# Patient Record
Sex: FEMALE | Race: WHITE | Employment: OTHER | URBAN - METROPOLITAN AREA
[De-identification: names, ages, dates, MRNs, and addresses within clinical notes are randomized per-mention and may not be internally consistent; named-entity substitution may affect disease eponyms.]

---

## 2017-07-05 ENCOUNTER — RADIANT APPOINTMENT (OUTPATIENT)
Dept: GENERAL RADIOLOGY | Facility: CLINIC | Age: 72
End: 2017-07-05
Attending: FAMILY MEDICINE
Payer: COMMERCIAL

## 2017-07-05 ENCOUNTER — OFFICE VISIT (OUTPATIENT)
Dept: FAMILY MEDICINE | Facility: CLINIC | Age: 72
End: 2017-07-05
Payer: COMMERCIAL

## 2017-07-05 VITALS
HEIGHT: 65 IN | SYSTOLIC BLOOD PRESSURE: 138 MMHG | DIASTOLIC BLOOD PRESSURE: 64 MMHG | BODY MASS INDEX: 25.16 KG/M2 | WEIGHT: 151 LBS | HEART RATE: 81 BPM | TEMPERATURE: 98.7 F

## 2017-07-05 DIAGNOSIS — Z13.1 SCREENING FOR DIABETES MELLITUS: ICD-10-CM

## 2017-07-05 DIAGNOSIS — R23.8 VESICULAR RASH: ICD-10-CM

## 2017-07-05 DIAGNOSIS — Z23 NEED FOR DIPHTHERIA-TETANUS-PERTUSSIS (TDAP) VACCINE, ADULT/ADOLESCENT: ICD-10-CM

## 2017-07-05 DIAGNOSIS — M77.41 METATARSALGIA OF RIGHT FOOT: ICD-10-CM

## 2017-07-05 DIAGNOSIS — Z86.0100 HISTORY OF COLONIC POLYPS: ICD-10-CM

## 2017-07-05 DIAGNOSIS — Z13.220 SCREENING CHOLESTEROL LEVEL: ICD-10-CM

## 2017-07-05 DIAGNOSIS — M77.41 METATARSALGIA OF RIGHT FOOT: Primary | ICD-10-CM

## 2017-07-05 PROCEDURE — 73630 X-RAY EXAM OF FOOT: CPT | Mod: RT

## 2017-07-05 PROCEDURE — 90471 IMMUNIZATION ADMIN: CPT | Performed by: FAMILY MEDICINE

## 2017-07-05 PROCEDURE — 99214 OFFICE O/P EST MOD 30 MIN: CPT | Mod: 25 | Performed by: FAMILY MEDICINE

## 2017-07-05 PROCEDURE — 90715 TDAP VACCINE 7 YRS/> IM: CPT | Performed by: FAMILY MEDICINE

## 2017-07-05 RX ORDER — TRIAMCINOLONE ACETONIDE 5 MG/G
CREAM TOPICAL
Qty: 30 G | Refills: 1 | Status: SHIPPED | OUTPATIENT
Start: 2017-07-05 | End: 2022-01-01

## 2017-07-05 NOTE — NURSING NOTE
"Chief Complaint   Patient presents with     Musculoskeletal Problem       Initial /77  Pulse 81  Temp 98.7  F (37.1  C) (Oral)  Ht 5' 4.5\" (1.638 m)  Wt 151 lb (68.5 kg)  BMI 25.52 kg/m2 Estimated body mass index is 25.52 kg/(m^2) as calculated from the following:    Height as of this encounter: 5' 4.5\" (1.638 m).    Weight as of this encounter: 151 lb (68.5 kg).  Medication Reconciliation: complete     Gabriela Del Valle MA      "

## 2017-07-05 NOTE — NURSING NOTE
Screening Questionnaire for Adult Immunization    Are you sick today?   No   Do you have allergies to medications, food, a vaccine component or latex?   No   Have you ever had a serious reaction after receiving a vaccination?   No   Do you have a long-term health problem with heart disease, lung disease, asthma, kidney disease, metabolic disease (e.g. diabetes), anemia, or other blood disorder?   No   Do you have cancer, leukemia, HIV/AIDS, or any other immune system problem?   No   In the past 3 months, have you taken medications that affect  your immune system, such as prednisone, other steroids, or anticancer drugs; drugs for the treatment of rheumatoid arthritis, Crohn s disease, or psoriasis; or have you had radiation treatments?   No   Have you had a seizure, or a brain or other nervous system problem?   No   During the past year, have you received a transfusion of blood or blood     products, or been given immune (gamma) globulin or antiviral drug?   No   For women: Are you pregnant or is there a chance you could become        pregnant during the next month?   No   Have you received any vaccinations in the past 4 weeks?   No     Immunization questionnaire answers were all negative.      MNVFC doesn't apply on this patient    Per orders of Dr. Castillo, injection of TDAP given by Gabriela Del Valle. Patient instructed to remain in clinic for 15 minutes afterwards, and to report any adverse reaction to me immediately.       Screening performed by Gabriela Del Valle on 7/5/2017 at 12:36 PM.

## 2017-07-05 NOTE — MR AVS SNAPSHOT
After Visit Summary   7/5/2017    Marga Clark    MRN: [id number]           Patient Information     Date Of Birth          1945        Visit Information        Provider Department      7/5/2017 11:40 AM Tiarra Mak MD [facility]        Today's Diagnoses     Metatarsalgia of right foot    -  1    Vesicular rash        History of colonic polyps        Need for diphtheria-tetanus-pertussis (Tdap) vaccine, adult/adolescent        Screening for diabetes mellitus        Screening cholesterol level           Follow-ups after your visit        Additional Services     GASTROENTEROLOGY ADULT REF PROCEDURE ONLY       Last Lab Result: Creatinine (mg/dL)       Date                     Value                 10/30/2013               0.62             ----------  There is no height or weight on file to calculate BMI.     Needed:  No  Language:  English    Patient will be contacted to schedule procedure.     Please be aware that coverage of these services is subject to the terms and limitations of your health insurance plan.  Call member services at your health plan with any benefit or coverage questions.  Any procedures must be performed at a [facility] facility OR coordinated by your clinic's referral office.    Please bring the following with you to your appointment:    (1) Any X-Rays, CTs or MRIs which have been performed.  Contact the facility where they were done to arrange for  prior to your scheduled appointment.    (2) List of current medications   (3) This referral request   (4) Any documents/labs given to you for this referral                  Your next 10 appointments already scheduled     Jul 14, 2017  8:15 AM CDT   LAB with AN LAB   [facility]    [address] [address] [phone]           Patient must bring picture ID.  Patient should be prepared to give a urine specimen  Please do not eat 10-12  "hours before your appointment if you are coming in fasting for labs on lipids, cholesterol, or glucose (sugar).  Pregnant women should follow their Care Team instructions. Water with medications is okay. Do not drink coffee or other fluids.   If you have concerns about taking  your medications, please ask at office or if scheduling via [facility], send a message by clicking on Secure Messaging, Message Your Care Team.              Future tests that were ordered for you today     Open Future Orders        Priority Expected Expires Ordered    **Lipid panel reflex to direct LDL FUTURE anytime Routine 7/5/2017 7/5/2018 7/5/2017    **Glucose FUTURE anytime Routine 7/5/2017 7/5/2018 7/5/2017            Who to contact     If you have questions or need follow up information about today's clinic visit or your schedule please contact [facility] directly at [phone].  Normal or non-critical lab and imaging results will be communicated to you by MyChart, letter or phone within 4 business days after the clinic has received the results. If you do not hear from us within 7 days, please contact the clinic through [facility] or phone. If you have a critical or abnormal lab result, we will notify you by phone as soon as possible.  Submit refill requests through [employer] or call your pharmacy and they will forward the refill request to us. Please allow 3 business days for your refill to be completed.          Additional Information About Your Visit        [facility] Information     [facility] lets you send messages to your doctor, view your test results, renew your prescriptions, schedule appointments and more. To sign up, go to [email or web address] . Click on \"Log in\" on the left side of the screen, which will take you to the Welcome page. Then click on \"Sign up Now\" on the right side of the page.     You will be asked to enter the access code listed below, as well as some personal information. Please follow the directions " "to create your username and password.     Your access code is: [email or web address]  Expires: 10/4/2017 10:32 AM     Your access code will  in 90 days. If you need help or a new code, please call your [facility] or [phone].        Care EveryWhere ID     This is your Care EveryWhere ID. This could be used by other organizations to access your [facility] [place] medical records  [id number]        Your Vitals Were     Pulse Temperature Height BMI (Body Mass Index)          81 98.7  F (37.1  C) (Oral) 5' 4.5\" (1.638 m) 25.52 kg/m2         Blood Pressure from Last 3 Encounters:   17 138/64   10/30/13 139/73   13 141/83    Weight from Last 3 Encounters:   17 151 lb (68.5 kg)   10/30/13 154 lb (69.9 kg)   13 153 lb (69.4 kg)              We Performed the Following     GASTROENTEROLOGY ADULT REF PROCEDURE ONLY     TDAP VACCINE (ADACEL)          Today's Medication Changes          These changes are accurate as of: 17 11:59 PM.  If you have any questions, ask your nurse or doctor.               Start taking these medicines.        Dose/Directions    triamcinolone 0.5 % cream   Commonly known as:  KENALOG   Used for:  Vesicular rash   Started by:  Tiarra Mak MD        Apply to rash on feet bid til clear   Quantity:  30 g   Refills:  1         Stop taking these medicines if you haven't already. Please contact your care team if you have questions.     sulfamethoxazole-trimethoprim 800-160 MG per tablet   Commonly known as:  BACTRIM DS/SEPTRA DS   Stopped by:  Tiarra Mak MD                Where to get your medicines      These medications were sent to [facility]/pharmacy #[id number] - [place] - [address],   [address] [address] [address] [facility] [address] [place] [address]     Phone:  [phone]     triamcinolone 0.5 % cream                Primary Care Provider Office Phone # Fax #    Tiarra Mak [phone]       [facility] [id number] [place] [id number]      "   Equal Access to Services     [facility] Center[facility] : Hadii aad ku hadkayliefreeman Elsiejennifer, wamarkellda luqadaha, qamarista harrypeteedi berger. So [facility] [phone].    ATENCIÓN: Si habla español, tiene a saldivar disposición servicios gratuitos de asistencia lingüística. Llame al [phone].    We comply with applicable federal civil rights laws and [place] laws. We do not discriminate on the basis of race, color, national origin, age, disability sex, sexual orientation or gender identity.            Thank you!     Thank you for choosing [facility]  [facility]. Our goal is always to provide you with excellent care. Hearing back from our patients is one way we can continue to improve our services. Please take a few minutes to complete the written survey that you may receive in the mail after your visit with us. Thank you!             Your Updated Medication List - Protect others around you: Learn how to safely use, store and throw away your medicines at www.disposemymeds.org.          This list is accurate as of: 7/5/17 11:59 PM.  Always use your most recent med list.                   Brand Name Dispense Instructions for use Diagnosis    triamcinolone 0.5 % cream    KENALOG    30 g    Apply to rash on feet bid til clear    Vesicular rash

## 2017-07-05 NOTE — PROGRESS NOTES
"  SUBJECTIVE:                                                    Marga Clark is a 71 year old female who presents to clinic today for the following health issues:        Sore  right foot, hurts to walk on it x 2 months changes day to day     Pt with pain and callous build up on right foot just below MTP joint. Is painful to step on it.   Has been shaving down the callous but still has pain  No trauma to her foot.     Pt with vesicular intermittent rash to both feet. In past used steroid on it and improved.   Suspect dishydrotic eczema. Will refill    Pt due for colonoscopy and tdap and is agreeable        Problem list and histories reviewed & adjusted, as indicated.  Additional history: as documented    Labs reviewed in EPIC    Reviewed and updated as needed this visit by clinical staff  Tobacco  Allergies  Meds  Med Hx  Surg Hx  Fam Hx  Soc Hx      Reviewed and updated as needed this visit by Provider         ROS:  Constitutional, HEENT, cardiovascular, pulmonary, gi and gu systems are negative, except as otherwise noted.    OBJECTIVE:     /77  Pulse 81  Temp 98.7  F (37.1  C) (Oral)  Ht 5' 4.5\" (1.638 m)  Wt 151 lb (68.5 kg)  BMI 25.52 kg/m2  Body mass index is 25.52 kg/(m^2).  GENERAL: healthy, alert and no distress  MS: no gross musculoskeletal defects noted, no edema, callous and pain to palpation over distal 5th metatarsal. Did pare down the callous somewhat and pain was slightly improved.   SKIN: dry vesicular lesions noted over posterior heels and extending onto plantar surface of both feet.     Diagnostic Test Results:  Xray - right foot. : normal    ASSESSMENT/PLAN:       1. Metatarsalgia of right foot  Metatarsal pad, rest, good shoes, keep wearing down callous as it builds up. If not improving, then fu with podiatry  - XR Foot Right G/E 3 Views; Future    2. Vesicular rash  Use sparingly as needed  - triamcinolone (KENALOG) 0.5 % cream; Apply to rash on feet bid til clear  Dispense: " 30 g; Refill: 1    3. History of colonic polyps    - GASTROENTEROLOGY ADULT REF PROCEDURE ONLY    4. Need for diphtheria-tetanus-pertussis (Tdap) vaccine, adult/adolescent  given  - TDAP VACCINE (ADACEL)    5. Screening for diabetes mellitus    - **Lipid panel reflex to direct LDL FUTURE anytime; Future  - **Glucose FUTURE anytime; Future    6. Screening cholesterol level    - **Lipid panel reflex to direct LDL FUTURE anytime; Future        Tiarra Castillo MD  [facility]

## 2017-07-14 DIAGNOSIS — Z13.220 SCREENING CHOLESTEROL LEVEL: ICD-10-CM

## 2017-07-14 DIAGNOSIS — Z13.1 SCREENING FOR DIABETES MELLITUS: ICD-10-CM

## 2017-07-14 LAB
CHOLEST SERPL-MCNC: 230 MG/DL
GLUCOSE SERPL-MCNC: 80 MG/DL (ref 70–99)
HDLC SERPL-MCNC: 73 MG/DL
LDLC SERPL CALC-MCNC: 143 MG/DL
NONHDLC SERPL-MCNC: 157 MG/DL
TRIGL SERPL-MCNC: 72 MG/DL

## 2017-07-14 PROCEDURE — 36415 COLL VENOUS BLD VENIPUNCTURE: CPT | Performed by: FAMILY MEDICINE

## 2017-07-14 PROCEDURE — 82947 ASSAY GLUCOSE BLOOD QUANT: CPT | Performed by: FAMILY MEDICINE

## 2017-07-14 PROCEDURE — 80061 LIPID PANEL: CPT | Performed by: FAMILY MEDICINE

## 2017-07-14 NOTE — PROGRESS NOTES
I have reviewed the patient's lab(s) or message and I feel they can be held for the provider until they return to clinic.

## 2017-08-02 ENCOUNTER — TELEPHONE (OUTPATIENT)
Dept: FAMILY MEDICINE | Facility: CLINIC | Age: 72
End: 2017-08-02

## 2017-08-02 NOTE — TELEPHONE ENCOUNTER
Notes Recorded by Tiarra Mak MD on 7/16/2017 at 11:23 AM  Your cholesterol has improved.  Great job. Keep up the good work  Your blood sugar is also normal.  Tiarra Mak    Called patient, results went to mychart, patient is no active on mychart. Updated her mychart status to declined. Read patient the result note per Dr. Tiarra Mak. Patient verbalized understanding     , patient would like result note and labs sent via letter to her home.      Emily Zimmer, NEYMARN RN

## 2017-08-02 NOTE — TELEPHONE ENCOUNTER
Patient states she would like the results of her blood tests from 7-14.  Please call.    Thank you.

## 2017-08-02 NOTE — LETTER
[facility]  [address] [facility] [address] [phone]        August 2, 2017    Marga Clark  [address] [address]          Dear Marga,    Your cholesterol has improved.  Great job.  Keep up the good work.  Your blood sugar is also normal.    If you have any questions or concerns, please call myself or my nurse at [phone].    Sincerely,    Tiarra Castillo MD/meghana      Results for orders placed or performed in visit on 07/14/17   **Lipid panel reflex to direct LDL FUTURE anytime   Result Value Ref Range    Cholesterol 230 (H) <200 mg/dL    Triglycerides 72 <150 mg/dL    HDL Cholesterol 73 >49 mg/dL    LDL Cholesterol Calculated 143 (H) <100 mg/dL    Non HDL Cholesterol 157 (H) <130 mg/dL   **Glucose FUTURE anytime   Result Value Ref Range    Glucose 80 70 - 99 mg/dL

## 2017-08-25 ENCOUNTER — OFFICE VISIT (OUTPATIENT)
Dept: FAMILY MEDICINE | Facility: CLINIC | Age: 72
End: 2017-08-25
Payer: COMMERCIAL

## 2017-08-25 VITALS
TEMPERATURE: 97.9 F | DIASTOLIC BLOOD PRESSURE: 62 MMHG | SYSTOLIC BLOOD PRESSURE: 148 MMHG | HEART RATE: 90 BPM | WEIGHT: 152 LBS | BODY MASS INDEX: 25.69 KG/M2 | OXYGEN SATURATION: 96 %

## 2017-08-25 DIAGNOSIS — J01.90 ACUTE SINUSITIS WITH SYMPTOMS > 10 DAYS: Primary | ICD-10-CM

## 2017-08-25 DIAGNOSIS — R05.9 COUGH: ICD-10-CM

## 2017-08-25 PROCEDURE — 99213 OFFICE O/P EST LOW 20 MIN: CPT | Performed by: PHYSICIAN ASSISTANT

## 2017-08-25 RX ORDER — AMOXICILLIN 500 MG/1
1000 CAPSULE ORAL 2 TIMES DAILY
Qty: 40 CAPSULE | Refills: 0 | Status: SHIPPED | OUTPATIENT
Start: 2017-08-25 | End: 2017-09-04

## 2017-08-25 RX ORDER — FLUTICASONE PROPIONATE 50 MCG
2 SPRAY, SUSPENSION (ML) NASAL DAILY
Qty: 16 G | Refills: 0 | Status: SHIPPED | OUTPATIENT
Start: 2017-08-25 | End: 2019-05-15

## 2017-08-25 RX ORDER — BENZONATATE 200 MG/1
200 CAPSULE ORAL 3 TIMES DAILY PRN
Qty: 21 CAPSULE | Refills: 0 | Status: SHIPPED | OUTPATIENT
Start: 2017-08-25 | End: 2019-05-15

## 2017-08-25 RX ORDER — HYDROXYCHLOROQUINE SULFATE 200 MG/1
TABLET, FILM COATED ORAL
COMMUNITY
Start: 2017-05-11 | End: 2017-10-30

## 2017-08-25 NOTE — PATIENT INSTRUCTIONS
Rest and increase fluids.    Sleep with head of bed elevated at night. Have a humidifier running at night    Alternate motrin and tylenol as needed for discomfort.    Perform nasal saline rinses to help decrease nasal congestion or breath in steam multiple times daily.     Use the flonase as prescribed for the next 1-2 weeks.    Take the tessalon perles as prescribed for your cough.    Follow up with primary care provider in 1-2 weeks if no improvement of symptoms. Sooner if any worsening of symptoms.

## 2017-08-25 NOTE — PROGRESS NOTES
SUBJECTIVE:   Marga Clark is a 71 year old female who presents to clinic today for the following health issues:      SINUS SYMPTOMS      Duration: 1 month    Description  Cough - was intermittent initially, but has become persist - productive with clear phlegm; shortness of breath with coughing fits, chest pain only with cough fits. denies wheezing.  Nasal congestion and maxillary sinus pressure - she is having headaches from the pressure. Intermittent maxillary teeth pain from sinus pressure.  Subjective fevers on Wednesday.   Denies significant sore throat, ear pain, and rashes.    Severity: moderate    Accompanying signs and symptoms: see above    History (predisposing factors):  Started with cold sx's but has gotten worse per pt    Precipitating or alleviating factors: None    Therapies tried and outcome: maria luz-nikkieer - little relief       Patient is on Plaquenil for treatment of inflammatory arthritis. She follows with rheumatology through [facility]. She may be returning to [facility] in the future. Denies any problems or complications with her inflammatory arthritis.        Problem list and histories reviewed & adjusted, as indicated.  Additional history: as documented    Patient Active Problem List   Diagnosis     CARDIOVASCULAR SCREENING; LDL GOAL LESS THAN 130     Advanced directives, counseling/discussion     High risk medications (not anticoagulants) long-term use     Inflammatory arthritis     Steroid long-term use     History reviewed. No pertinent surgical history.    Social History   Substance Use Topics     Smoking status: Current Every Day Smoker     Packs/day: 0.50     Years: 40.00     Types: Cigarettes     Smokeless tobacco: Never Used     Alcohol use Yes      Comment: occas     Family History   Problem Relation Age of Onset     CEREBROVASCULAR DISEASE Mother      HEART DISEASE Father      DIABETES Maternal Grandfather      DIABETES Paternal Grandfather          Current Outpatient  Prescriptions   Medication Sig Dispense Refill     hydroxychloroquine (PLAQUENIL) 200 MG tablet Take one pill by mouth twice daily       amoxicillin (AMOXIL) 500 MG capsule Take 2 capsules (1,000 mg) by mouth 2 times daily for 10 days 40 capsule 0     fluticasone (FLONASE) 50 MCG/ACT spray Spray 2 sprays into both nostrils daily 16 g 0     benzonatate (TESSALON) 200 MG capsule Take 1 capsule (200 mg) by mouth 3 times daily as needed for cough 21 capsule 0     triamcinolone (KENALOG) 0.5 % cream Apply to rash on feet bid til clear 30 g 1     No Known Allergies  BP Readings from Last 3 Encounters:   08/25/17 157/75   07/05/17 138/64   10/30/13 139/73    Wt Readings from Last 3 Encounters:   08/25/17 152 lb (68.9 kg)   07/05/17 151 lb (68.5 kg)   10/30/13 154 lb (69.9 kg)                        Reviewed and updated as needed this visit by clinical staffTobacco  Allergies  Meds  Med Hx  Surg Hx  Fam Hx  Soc Hx      Reviewed and updated as needed this visit by Provider         ROS:  Constitutional, HEENT, cardiovascular, pulmonary, and integumentary systems are negative, except as otherwise noted.      OBJECTIVE:   /62  Pulse 90  Temp 97.9  F (36.6  C) (Oral)  Wt 152 lb (68.9 kg)  SpO2 96%  BMI 25.69 kg/m2  Body mass index is 25.69 kg/(m^2).  GENERAL: healthy, alert and no distress  EYES: Eyes grossly normal to inspection  HENT: normal cephalic/atraumatic, ear canals and TM's normal, nose and mouth without ulcers or lesions, rhinorrhea clear and yellow, oropharynx clear, oral mucous membranes moist and sinuses: maxillary, frontal tenderness on both sides  NECK: no adenopathy, no asymmetry, masses, or scars and thyroid normal to palpation  RESP: lungs clear to auscultation - no rales, rhonchi or wheezes  CV: regular rate and rhythm, normal S1 S2, no S3 or S4, no murmur, click or rub, no peripheral edema and peripheral pulses strong  MS: no gross musculoskeletal defects noted, no edema  SKIN: no suspicious  lesions or rashes        ASSESSMENT/PLAN:       ICD-10-CM    1. Acute sinusitis with symptoms > 10 days J01.90 amoxicillin (AMOXIL) 500 MG capsule     fluticasone (FLONASE) 50 MCG/ACT spray   2. Cough R05 benzonatate (TESSALON) 200 MG capsule       Patient Instructions   Rest and increase fluids.    Sleep with head of bed elevated at night. Have a humidifier running at night    Alternate motrin and tylenol as needed for discomfort.    Perform nasal saline rinses to help decrease nasal congestion or breath in steam multiple times daily.     Use the flonase as prescribed for the next 1-2 weeks.    Take the tessalon perles as prescribed for your cough.    Follow up with primary care provider in 1-2 weeks if no improvement of symptoms. Sooner if any worsening of symptoms.           Mana Ramos PA-C  [facility]

## 2017-08-25 NOTE — NURSING NOTE
"Chief Complaint   Patient presents with     Sinus Problem       Initial /75  Pulse 90  Temp 97.9  F (36.6  C) (Oral)  Wt 152 lb (68.9 kg)  SpO2 96%  BMI 25.69 kg/m2 Estimated body mass index is 25.69 kg/(m^2) as calculated from the following:    Height as of 7/5/17: 5' 4.5\" (1.638 m).    Weight as of this encounter: 152 lb (68.9 kg).  Medication Reconciliation: complete  Emily Batres CMA    "

## 2017-08-25 NOTE — MR AVS SNAPSHOT
After Visit Summary   8/25/2017    Marga Clark    MRN: [id number]           Patient Information     Date Of Birth          1945        Visit Information        Provider Department      8/25/2017 10:40 AM Mana Ramos PA-C [facility]        Today's Diagnoses     Acute sinusitis with symptoms > 10 days    -  1    Cough          Care Instructions    Rest and increase fluids.    Sleep with head of bed elevated at night. Have a humidifier running at night    Alternate motrin and tylenol as needed for discomfort.    Perform nasal saline rinses to help decrease nasal congestion or breath in steam multiple times daily.     Use the flonase as prescribed for the next 1-2 weeks.    Take the tessalon perles as prescribed for your cough.    Follow up with primary care provider in 1-2 weeks if no improvement of symptoms. Sooner if any worsening of symptoms.               Follow-ups after your visit        Who to contact     If you have questions or need follow up information about today's clinic visit or your schedule please contact [facility] directly at [phone].  Normal or non-critical lab and imaging results will be communicated to you by MyChart, letter or phone within 4 business days after the clinic has received the results. If you do not hear from us within 7 days, please contact the clinic through MyChart or phone. If you have a critical or abnormal lab result, we will notify you by phone as soon as possible.  Submit refill requests through [employer] or call your pharmacy and they will forward the refill request to us. Please allow 3 business days for your refill to be completed.          Additional Information About Your Visit        Care EveryWhere ID     This is your Care EveryWhere ID. This could be used by other organizations to access your [facility] medical records  [id number]        Your Vitals Were     Pulse Temperature Pulse Oximetry BMI (Body  Mass Index)          90 97.9  F (36.6  C) (Oral) 96% 25.69 kg/m2         Blood Pressure from Last 3 Encounters:   08/25/17 157/75   07/05/17 138/64   10/30/13 139/73    Weight from Last 3 Encounters:   08/25/17 152 lb (68.9 kg)   07/05/17 151 lb (68.5 kg)   10/30/13 154 lb (69.9 kg)              Today, you had the following     No orders found for display         Today's Medication Changes          These changes are accurate as of: 8/25/17 11:03 AM.  If you have any questions, ask your nurse or doctor.               Start taking these medicines.        Dose/Directions    amoxicillin 500 MG capsule   Commonly known as:  AMOXIL   Used for:  Acute sinusitis with symptoms > 10 days   Started by:  Mana Ramos PA-C        Dose:  1000 mg   Take 2 capsules (1,000 mg) by mouth 2 times daily for 10 days   Quantity:  40 capsule   Refills:  0       benzonatate 200 MG capsule   Commonly known as:  TESSALON   Used for:  Cough   Started by:  Mana Ramos PA-C        Dose:  200 mg   Take 1 capsule (200 mg) by mouth 3 times daily as needed for cough   Quantity:  21 capsule   Refills:  0       fluticasone 50 MCG/ACT spray   Commonly known as:  FLONASE   Used for:  Acute sinusitis with symptoms > 10 days   Started by:  Mana Ramos PA-C        Dose:  2 spray   Spray 2 sprays into both nostrils daily   Quantity:  16 g   Refills:  0            Where to get your medicines      These medications were sent to [id number] - [place], [place] - [address] [facility]  [address] [facility] [address]    Hours:  24-hours Phone:  [phone]     amoxicillin 500 MG capsule    benzonatate 200 MG capsule    fluticasone 50 MCG/ACT spray                Primary Care Provider Office Phone # Fax #    Tiarra Mak [phone] [address] [facility] [address]        Equal Access to Services     KORI BENITEZ AH: Billie iraheta  josesito Tello, wamarkellda lumontanaadaha, qaybta kaalmada aime, edi alyssain hayaan matthieumilvia tysonkt laalejandraregina ferny. So [facility] [phone].    ATENCIÓN: Si habla louann, tiene a saldivar disposición servicios gratuitos de asistencia lingüística. Re al [phone].    We comply with applicable federal civil rights laws and [place] laws. We do not discriminate on the basis of race, color, national origin, age, disability sex, sexual orientation or gender identity.            Thank you!     Thank you for choosing [facility]  for your care. Our goal is always to provide you with excellent care. Hearing back from our patients is one way we can continue to improve our services. Please take a few minutes to complete the written survey that you may receive in the mail after your visit with us. Thank you!             Your Updated Medication List - Protect others around you: Learn how to safely use, store and throw away your medicines at www.disposemymeds.org.          This list is accurate as of: 8/25/17 11:03 AM.  Always use your most recent med list.                   Brand Name Dispense Instructions for use Diagnosis    amoxicillin 500 MG capsule    AMOXIL    40 capsule    Take 2 capsules (1,000 mg) by mouth 2 times daily for 10 days    Acute sinusitis with symptoms > 10 days       benzonatate 200 MG capsule    TESSALON    21 capsule    Take 1 capsule (200 mg) by mouth 3 times daily as needed for cough    Cough       fluticasone 50 MCG/ACT spray    FLONASE    16 g    Spray 2 sprays into both nostrils daily    Acute sinusitis with symptoms > 10 days       hydroxychloroquine 200 MG tablet    PLAQUENIL     Take one pill by mouth twice daily        triamcinolone 0.5 % cream    KENALOG    30 g    Apply to rash on feet bid til clear    Vesicular rash

## 2017-08-28 ENCOUNTER — TELEPHONE (OUTPATIENT)
Dept: FAMILY MEDICINE | Facility: CLINIC | Age: 72
End: 2017-08-28

## 2017-08-28 NOTE — TELEPHONE ENCOUNTER
Reason for call:  Patient reporting a symptom    Symptom or request: stomach upset    Duration (how long have symptoms been present): Friday    Have you been treated for this before? No    Additional comments: Patient started an antibiotic on Friday, currently having stomach upset, wondering if she could switch to a different medicaiton    Phone Number patient can be reached at:  Home number on file [phone] (home)    Best Time:  any    Can we leave a detailed message on this number:  YES    Call taken on 8/28/2017 at 7:57 AM by Maida Sapp

## 2017-08-28 NOTE — TELEPHONE ENCOUNTER
Per 8/25/17 ov   Patient Instructions   Rest and increase fluids.  Sleep with head of bed elevated at night. Have a humidifier running at night  Alternate motrin and tylenol as needed for discomfort.  Perform nasal saline rinses to help decrease nasal congestion or breath in steam multiple times daily.   Use the flonase as prescribed for the next 1-2 weeks.  Take the tessalon perles as prescribed for your cough.  Follow up with primary care provider in 1-2 weeks if no improvement of symptoms. Sooner if any worsening of symptoms.   Mana Ramos PA-C  [facility]    Patient started the prescribed medications Friday night. Flonase, tessalon, and amoxicillin.  Saturday morning she felt okay. Stomach upset started Saturday afternoon. Nausea and abdominal cramping. Rating her discomfort as moderate now. She continued to take antibiotic through Sunday.    She has never been on amoxicillin.   She didn't take antibiotic today. She is requesting alternative antibiotic.   To provider to advise.   NEYMAR MayfieldN RN

## 2017-08-28 NOTE — TELEPHONE ENCOUNTER
Is the abdominal cramping persistent or only after she takes the medication? Is she taking the medication with food? Are her sinus symptoms improving? This antibiotic should be easy on her stomach. My fear of changing antibiotics at this time would be increase in resistance and worsening of her abdominal symptoms. If symptoms are that intolerable, I would be willing to try Zithromax, which she has had in the past.     Mana Ramos PA-C

## 2017-10-08 ENCOUNTER — HEALTH MAINTENANCE LETTER (OUTPATIENT)
Age: 72
End: 2017-10-08

## 2017-10-30 ENCOUNTER — OFFICE VISIT (OUTPATIENT)
Dept: RHEUMATOLOGY | Facility: CLINIC | Age: 72
End: 2017-10-30
Payer: COMMERCIAL

## 2017-10-30 VITALS
BODY MASS INDEX: 24.56 KG/M2 | DIASTOLIC BLOOD PRESSURE: 82 MMHG | OXYGEN SATURATION: 98 % | HEART RATE: 81 BPM | WEIGHT: 147.4 LBS | HEIGHT: 65 IN | SYSTOLIC BLOOD PRESSURE: 154 MMHG | TEMPERATURE: 97.5 F

## 2017-10-30 DIAGNOSIS — Z23 NEED FOR PROPHYLACTIC VACCINATION AGAINST STREPTOCOCCUS PNEUMONIAE (PNEUMOCOCCUS): ICD-10-CM

## 2017-10-30 DIAGNOSIS — M06.09 RHEUMATOID ARTHRITIS OF MULTIPLE SITES WITH NEGATIVE RHEUMATOID FACTOR (H): Primary | ICD-10-CM

## 2017-10-30 DIAGNOSIS — Z79.899 HIGH RISK MEDICATION USE: ICD-10-CM

## 2017-10-30 PROCEDURE — 99204 OFFICE O/P NEW MOD 45 MIN: CPT | Performed by: INTERNAL MEDICINE

## 2017-10-30 PROCEDURE — G0009 ADMIN PNEUMOCOCCAL VACCINE: HCPCS | Performed by: INTERNAL MEDICINE

## 2017-10-30 PROCEDURE — 90670 PCV13 VACCINE IM: CPT | Performed by: INTERNAL MEDICINE

## 2017-10-30 RX ORDER — HYDROXYCHLOROQUINE SULFATE 200 MG/1
200 TABLET, FILM COATED ORAL DAILY
Qty: 90 TABLET | Refills: 0 | Status: SHIPPED | OUTPATIENT
Start: 2017-10-30 | End: 2018-01-29

## 2017-10-30 NOTE — PROGRESS NOTES
Rheumatology Clinic Visit      Marga Clark MRN# [id number]   YOB: 1945 Age: 72 year old      Date of visit: 10/30/17   PCP: Dr. Tiarra Mak    Chief Complaint   Patient presents with:  Consult: patient states she is doing fine. Was an [facility] patient      Assessment and Plan     1. Rheumatoid arthritis (RF negative [[facility]], CCP low positive): Dx'd 2011.  Has been followed by Prince Ritter ([place]) and Frances ([facility]).  Established with me on 10/30/2017. Previously on MTX (effective), prednisone (effective).  MTX and prednisone were stopped when she was doing well; inflammatory markers elevated so HCQ was started (per record review and patient interview).  Currently on HCQ 200mg BID.  Doing well today.  Reduced HCQ today to 200mg daily and if still doing well at f/u then will discontinue.  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  - Reduce hydroxychloroquine to 200mg daily  - Ophthalmology referral for hydroxychloroquine toxicity monitoring  - Request eye exam records from [facility] in [facility], where Ms. Clark says she had HCQ tox monitoring 6-9 months ago  - Labs 2-3 days prior to the next rheumatology clinic visit: CBC, Creatinine, Hepatic Panel, ESR, CRP, Hepatitis B    # Hydroxychloroquine (Plaquenil) Risks and Benefits:  The risks and benefits of hydroxychloroquine were discussed in detail and the patient verbalized understanding; the patient also verbalized agreement to get the required ophthalmologic toxicity monitoring.  The risks discussed include, but are not limited to, the risk for hypersensitivity, anaphylaxis, anaphylactoid reactions, irreversible retinal damage, rare hematologic reactions, and rare cardiomyopathy.  Patients with G6PD deficiency or hepatic impairment may be at an increased risk for adverse effects.  I encouraged reviewing the package insert and asking any questions about the medication.      2. Left lateral epicondylitis: Has been physical therapy  twice in the past, per patient, with symptoms resolving as long as she does the exercises. Symptoms worsen if she does not do the exercises. She says that she plans to restart the exercises.    3. Hypertersion: Blood pressure checked this clinic visit and is elevated; I advised her to f/u with her PCP for management.     4. Vaccinations: Vaccinations reviewed with Ms. Clark.  Risks and benefits of vaccinations were discussed.  - Influenza: refused by patient  - [id number]: will receive today    Ms. Clark verbalized agreement with and understanding of the rational for the diagnosis and treatment plan.  All questions were answered to best of my ability and the patient's satisfaction. Ms. Clark was advised to contact the clinic with any questions that may arise after the clinic visit.      Thank you for involving me in the care of the patient    Return to clinic: 3 months      HPI   Marga Clark is a 72 year old female with a past medical history significant for inflammatory arthritis who presents for evaluation of inflammatory arthritis.    7/9/2013 rheumatology clinic note by Dr. Gama Ritter document psoriatic arthritis, and low positive CCP antibodies. Assessment and plan documents likely rheumatoid arthritis with positive antibodies. Doing well with methotrexate.    5/11/2017 [facility] rheumatology clinic note by Dr. Daniels documents rheumatoid arthritis treated with Plaquenil 200 mg twice daily.     Today, Ms. Clark reports that she is doing very well with regard to rheumatoid arthritis. When she first presented, she had pain and stiffness in her joints that resolved quickly with prednisone and methotrexate. When she transferred care from [place] to [facility], prednisone and methotrexate were discontinued. Subsequent labs had inflammatory markers elevated, but she says that she felt fine, but to keep the rheumatoid arthritis under control hydroxychloroquine was started. She says that she still feels  well. Some pain with more activity in her hands that improves with rest. No pain when she wakes up in the morning. Morning stiffness for no more than 2 minutes. She does have a history of bilateral trochanteric bursitis that has been treated with steroid injections that were very effective; she does not feel like they are needed again today. She also has a history of left lateral epicondylitis was treated with physical therapy; if she does not do the physical therapy exercises but her symptoms return.    Denies fevers, chills, nausea, vomiting, constipation, diarrhea. No abdominal pain. No chest pain/pressure, palpitations, or shortness of breath. No LE swelling. No neck pain. No oral or nasal sores.  No rash. No sicca symptoms.     Tobacco: 0.5 ppd  EtOH: Occasional  Drugs: None    ROS   GEN: No fevers, chills, or night sweats  SKIN: No itching, rashes, sores  HEENT: No oral or nasal ulcers.  CV: No chest pain, pressure, palpitations, or dyspnea on exertion.  PULM: No SOB, wheeze, cough.  GI: No nausea, vomiting, constipation, diarrhea. No blood in stool. No abdominal pain.  : No blood in urine.  MSK: See HPI.  NEURO: No numbness, tingling, or weakness.  EXT: No LE swelling  PSYCH: Negative    Active Problem List     Patient Active Problem List   Diagnosis     CARDIOVASCULAR SCREENING; LDL GOAL LESS THAN 130     Advanced directives, counseling/discussion     High risk medications (not anticoagulants) long-term use     Inflammatory arthritis     Steroid long-term use     Past Medical History     Past Medical History:   Diagnosis Date     Inflammatory arthritis 10/31/2011     Past Surgical History   History reviewed. No pertinent surgical history.  Allergy   No Known Allergies  Current Medication List     Current Outpatient Prescriptions   Medication Sig     hydroxychloroquine (PLAQUENIL) 200 MG tablet Take 1 tablet (200 mg) by mouth daily     triamcinolone (KENALOG) 0.5 % cream Apply to rash on feet bid til clear  "    fluticasone (FLONASE) 50 MCG/ACT spray Spray 2 sprays into both nostrils daily (Patient not taking: Reported on 10/30/2017)     benzonatate (TESSALON) 200 MG capsule Take 1 capsule (200 mg) by mouth 3 times daily as needed for cough (Patient not taking: Reported on 10/30/2017)     No current facility-administered medications for this visit.          Social History   See HPI    Family History     Family History   Problem Relation Age of Onset     CEREBROVASCULAR DISEASE Mother      HEART DISEASE Father      DIABETES Maternal Grandfather      DIABETES Paternal Grandfather      Physical Exam     Temp Readings from Last 3 Encounters:   10/30/17 97.5  F (36.4  C) (Oral)   08/25/17 97.9  F (36.6  C) (Oral)   07/05/17 98.7  F (37.1  C) (Oral)     BP Readings from Last 5 Encounters:   10/30/17 154/82   08/25/17 148/62   07/05/17 138/64   10/30/13 139/73   09/06/13 141/83     Pulse Readings from Last 1 Encounters:   10/30/17 81     Resp Readings from Last 1 Encounters:   10/22/12 10     Estimated body mass index is 24.91 kg/(m^2) as calculated from the following:    Height as of this encounter: 1.638 m (5' 4.5\").    Weight as of this encounter: 66.9 kg (147 lb 6.4 oz).    GEN: NAD  HEENT: MMM. Anicteric, noninjected sclera  CV: S1, S2. RRR. No m/r/g.  PULM: CTA bilaterally. No w/c.  ABD: +BS.   MSK:  Hands, wrists, and right elbow without synovial swelling, increased warmth, tenderness to palpation, or overlying erythema.  Negative MCP squeeze.  Normal  strength. Small Heberden's nodes present, bilaterally. Left lateral elbow mildly tender to direct palpation. Bilateral shoulders nontender to palpation and without swelling.  Bilateral hips nontender to direct palpation. Knees, ankles, and feet without swelling, increased warmth, tenderness to palpation, or overlying erythema. Negative MTP squeeze.     NEURO: UE and LE strengths 5/5 and equal bilaterally.   SKIN: No rash  EXT: No LE edema  PSYCH: Alert. " Appropriate.    Labs / Imaging (select studies)     RF/CCP  Recent Labs   Lab Test  10/17/11   1541   CCPABY  33*     AMANDA  Recent Labs   Lab Test  10/17/11   1541   JIMMY  <1.0 Interpretation:  Negative     ANCA  Recent Labs   Lab Test  10/17/11   1541   MPOAB  0     CBC  Recent Labs   Lab Test  10/30/13   1003  07/09/13   0940  05/03/13   0904   WBC  12.5*  11.2*  8.9   RBC  4.41  4.48  4.44   HGB  13.4  14.0  13.3   HCT  41.1  42.9  42.1   MCV  93  96  95   RDW  13.5  13.8  13.7   PLT  247  217  234   MCH  30.4  31.2  30.0   MCHC  32.6  32.6  31.6   NEUTROPHIL  80.5  81.0  60.2   LYMPH  12.2  10.2  29.0   MONOCYTE  5.5  6.7  7.9   EOSINOPHIL  1.2  1.2  2.0   BASOPHIL  0.6  0.9  0.9   ANEU  10.0*  9.2*  5.3   ALYM  1.5  1.1  2.6   MORENITA  0.7  0.7  0.7   AEOS  0.2  0.1  0.2   ABAS  0.1  0.1  0.1     CMP  Recent Labs   Lab Test  07/14/17   0806  10/30/13   1003  07/09/13   0940  05/03/13   0904  11/19/12   0852   10/17/11   1541   NA   --   140   --    --    --    --   140   POTASSIUM   --   3.9   --    --    --    --   4.1   CHLORIDE   --   104   --    --    --    --   105   CO2   --   27   --    --    --    --   24   ANIONGAP   --   9   --    --    --    --   11   GLC  80  83   --    --    --    --   88   BUN   --   12   --    --    --    --   15   CR   --   0.62  0.63  0.63  0.65   < >  0.59   GFRESTIMATED   --   >90  >90  >90  >90   < >  >90   GFRESTBLACK   --   >90  >90  >90  >90   < >  >90   JOANNE   --   9.1   --    --    --    --   9.8   BILITOTAL   --   0.8  0.9  0.9  0.9   < >  0.4   ALBUMIN   --   3.9  3.9  4.1  4.0   < >  4.1   PROTTOTAL   --   7.4  7.3  7.4  7.5   < >  8.3   ALKPHOS   --   88  83  91  91   < >  131   AST   --   43  48*  49*  45   < >  38   ALT   --   29  26  37  26   < >  14    < > = values in this interval not displayed.     Calcium/VitaminD  Recent Labs   Lab Test  10/30/13   1003  10/17/11   1541   JOANNE  9.1  9.8     ESR/CRP  Recent Labs   Lab Test  07/09/13   0940  05/03/13   0904   11/19/12   0852   10/17/11   1541  08/19/11   1123   SED   --    --    --    --   65*  20   CRP  7.6  12.2*  9.8*   < >   --   16.3*    < > = values in this interval not displayed.     CK/Aldolase  Recent Labs   Lab Test  10/17/11   1541   CKT  58     TSH/T4  Recent Labs   Lab Test  10/17/11   1541   TSH  1.29     Hepatitis C  Recent Labs   Lab Test  10/31/11   0913   HCVAB  Negative     Tuberculosis Screening  Recent Labs   Lab Test  10/31/11   0915   TBRSLT  Negative   TBAGN  0.00     UA  Recent Labs   Lab Test  10/30/13   0912   COLOR  Yellow   APPEARANCE  Clear   URINEGLC  Negative   URINEBILI  Negative   SG  <=1.005   URINEPH  6.0   PROTEIN  Negative   UROBILINOGEN  0.2   NITRITE  Negative   UBLD  Small*   LEUKEST  Trace*   WBCU  O - 2   RBCU  2-5*   SQUAMOUSEPI  Few     Urine Microscopic  Recent Labs   Lab Test  10/30/13   0912   WBCU  O - 2   RBCU  2-5*   SQUAMOUSEPI  Few       Immunization History     Immunization History   Administered Date(s) Administered     Pneumococcal 23 valent 10/11/2011     TDAP Vaccine (Adacel) 01/10/2007, 07/05/2017     Zoster vaccine, live 07/28/2009          Chart documentation done in part with Dragon Voice recognition Software. Although reviewed after completion, some word and grammatical error may remain.    Kody Urias MD

## 2017-10-30 NOTE — NURSING NOTE
"Chief Complaint   Patient presents with     Consult     patient states she is doing fine. Was an [facility] patient       Initial /82 (BP Location: Left arm, Patient Position: Chair, Cuff Size: Adult Regular)  Pulse 81  Temp 97.5  F (36.4  C) (Oral)  Ht 1.638 m (5' 4.5\")  Wt 66.9 kg (147 lb 6.4 oz)  SpO2 98%  BMI 24.91 kg/m2 Estimated body mass index is 24.91 kg/(m^2) as calculated from the following:    Height as of this encounter: 1.638 m (5' 4.5\").    Weight as of this encounter: 66.9 kg (147 lb 6.4 oz).  BP completed using cuff size: regular         RAPID3 (0-30) Cumulative Score  5.3          RAPID3 Weighted Score (divide #4 by 3 and that is the weighted score)  1.77         "

## 2017-10-30 NOTE — MR AVS SNAPSHOT
After Visit Summary   10/30/2017    Marga Clark    MRN: [id number]           Patient Information     Date Of Birth          1945        Visit Information        Provider Department      10/30/2017 8:40 AM Kody Urias MD [facility]        Today's Diagnoses     Rheumatoid arthritis of multiple sites with negative rheumatoid factor (H)    -  1    High risk medication use          Care Instructions    Dr. Urias s Rheumatology Clinics  Locations Clinic Hours Telephone Number     [facility] [place]  [address]  [place] [address]     Wednesday: 7:20AM - 4:00PM  Thursday:     7:20AM - 4:00PM     Friday:          7:20AM - 11:00AM       To schedule an appointment with  Dr. Urias,  please contact  Specialty [phone]       [facility] [place]  [address]  [place] [address]       Monday:       7:20AM - 4:00PM        [facility]  [address] [place] [address]       Tuesday:      7:20AM - 4:00PM          Thank you!    Naomie Singh CMA              Follow-ups after your visit        Additional Services     OPHTHALMOLOGY ADULT REFERRAL       Your provider has referred you to:  [facility]: [facility] [phone]   [email or web address]    Reason for referral: Hydroxychloroquine (plaquenil) toxicity monitoring.     Please be aware that coverage of these services is subject to the terms and limitations of your health insurance plan.  Call member services at your health plan with any benefit or coverage questions.      Please bring the following to your appointment:  >>   Any x-rays, CTs or MRIs which have been performed.  Contact the facility where they were done to arrange for  prior to your scheduled appointment.  Any new CT, MRI or other procedures ordered by your specialist must be performed at a [facility] facility or coordinated by your clinic's referral office.    >>   List  of current medications   >>   This referral request   >>   Any documents/labs given to you for this referral                  Your next 10 appointments already scheduled     Jan 22, 2018  8:30 AM CST   LAB with BE LAB   [facility] ([facility] [place])    [address]  [place] [id number] [phone]           Patient must bring picture ID. Patient should be prepared to give a urine specimen  Please do not eat 10-12 hours before your appointment if you are coming in fasting for labs on lipids, cholesterol, or glucose (sugar). Pregnant women should follow their Care Team instructions. Water with medications is okay. Do not drink coffee or other fluids. If you have concerns about taking  your medications, please ask at office or if scheduling via Pick[facility], send a message by clicking on Secure Messaging, Message Your Care Team.            Jan 29, 2018  8:40 AM CST   Return Visit with MD Haim Malone ([facility])    [address]  [place] [address] [phone]              Future tests that were ordered for you today     Open Future Orders        Priority Expected Expires Ordered    CBC with platelets differential Routine 1/24/2018 2/12/2018 10/30/2017    Creatinine Routine 1/24/2018 2/12/2018 10/30/2017    Erythrocyte sedimentation rate auto Routine 1/24/2018 2/12/2018 10/30/2017    CRP inflammation Routine 1/24/2018 2/12/2018 10/30/2017    Hepatic panel Routine 1/24/2018 2/12/2018 10/30/2017    Hepatitis B surface antigen Routine 1/24/2018 2/12/2018 10/30/2017    Hepatitis B core antibody Routine 1/24/2018 2/12/2018 10/30/2017            Who to contact     If you have questions or need follow up information about today's clinic visit or your schedule please contact [facility] directly at [phone].  Normal or non-critical lab and imaging results will be communicated to you by MyChart, letter or phone within 4  "business days after the clinic has received the results. If you do not hear from us within 7 days, please contact the clinic through AskforTask or phone. If you have a critical or abnormal lab result, we will notify you by phone as soon as possible.  Submit refill requests through AskforTask or call your pharmacy and they will forward the refill request to us. Please allow 3 business days for your refill to be completed.          Additional Information About Your Visit        Care EveryWhere ID     This is your Care EveryWhere ID. This could be used by other organizations to access your [facility] medical records  [id number]        Your Vitals Were     Pulse Temperature Height Pulse Oximetry BMI (Body Mass Index)       81 97.5  F (36.4  C) (Oral) 1.638 m (5' 4.5\") 98% 24.91 kg/m2        Blood Pressure from Last 3 Encounters:   10/30/17 154/82   08/25/17 148/62   07/05/17 138/64    Weight from Last 3 Encounters:   10/30/17 66.9 kg (147 lb 6.4 oz)   08/25/17 68.9 kg (152 lb)   07/05/17 68.5 kg (151 lb)              We Performed the Following     OPHTHALMOLOGY ADULT REFERRAL          Today's Medication Changes          These changes are accurate as of: 10/30/17  9:24 AM.  If you have any questions, ask your nurse or doctor.               These medicines have changed or have updated prescriptions.        Dose/Directions    hydroxychloroquine 200 MG tablet   Commonly known as:  PLAQUENIL   This may have changed:  See the new instructions.   Used for:  Rheumatoid arthritis of multiple sites with negative rheumatoid factor (H)   Changed by:  Kody Urias MD        Dose:  200 mg   Take 1 tablet (200 mg) by mouth daily   Quantity:  90 tablet   Refills:  0            Where to get your medicines      These medications were sent to [facility]pharm[facility] #[id number] - [place], [place] - [address]  [address] [place] [address]     Phone:  [phone]     hydroxychloroquine 200 MG tablet                Primary " Care Provider Office Phone # Fax #    Tiarra Mak [phone] [address] [address] [address]        Equal Access to Services     KORI BENITEZ : Hadmichelle tuan iraheta leaho Elisha, waaxda luqadaha, qaybta kaalmada aime, edi diaz laTonysahara isaacs. So [facility] [phone].    ATENCIÓN: Si habla español, tiene a saldivar disposición servicios gratuitos de asistencia lingüística. Llame al [phone].    We comply with applicable federal civil rights laws and [place] laws. We do not discriminate on the basis of race, color, national origin, age, disability, sex, sexual orientation, or gender identity.            Thank you!     Thank you for choosing [facility]  for your care. Our goal is always to provide you with excellent care. Hearing back from our patients is one way we can continue to improve our services. Please take a few minutes to complete the written survey that you may receive in the mail after your visit with us. Thank you!             Your Updated Medication List - Protect others around you: Learn how to safely use, store and throw away your medicines at www.disposemymeds.org.          This list is accurate as of: 10/30/17  9:24 AM.  Always use your most recent med list.                   Brand Name Dispense Instructions for use Diagnosis    benzonatate 200 MG capsule    TESSALON    21 capsule    Take 1 capsule (200 mg) by mouth 3 times daily as needed for cough    Cough       fluticasone 50 MCG/ACT spray    FLONASE    16 g    Spray 2 sprays into both nostrils daily    Acute sinusitis with symptoms > 10 days       hydroxychloroquine 200 MG tablet    PLAQUENIL    90 tablet    Take 1 tablet (200 mg) by mouth daily    Rheumatoid arthritis of multiple sites with negative rheumatoid factor (H)       triamcinolone 0.5 % cream    KENALOG    30 g    Apply to rash on feet bid til clear    Vesicular rash

## 2017-10-30 NOTE — PATIENT INSTRUCTIONS
Dr. Urias s Rheumatology Clinics  Locations Clinic Hours Telephone Number     [facility] [place]  [address]  [place] [address]     Wednesday: 7:20AM - 4:00PM  Thursday:     7:20AM - 4:00PM     Friday:          7:20AM - 11:00AM       To schedule an appointment with  Dr. Urias,  please contact  Specialty [phone]       [facility] [place]  [address]  [place] [address]       Monday:       7:20AM - 4:00PM        [facility]  [address]  [place] [address]       Tuesday:      7:20AM - 4:00PM          Thank you!    Naomie Singh CMA

## 2018-01-22 DIAGNOSIS — M06.09 RHEUMATOID ARTHRITIS OF MULTIPLE SITES WITH NEGATIVE RHEUMATOID FACTOR (H): ICD-10-CM

## 2018-01-22 DIAGNOSIS — Z79.899 HIGH RISK MEDICATION USE: ICD-10-CM

## 2018-01-22 LAB
ALBUMIN SERPL-MCNC: 4 G/DL (ref 3.4–5)
ALP SERPL-CCNC: 91 U/L (ref 40–150)
ALT SERPL W P-5'-P-CCNC: 24 U/L (ref 0–50)
AST SERPL W P-5'-P-CCNC: 38 U/L (ref 0–45)
BASOPHILS # BLD AUTO: 0 10E9/L (ref 0–0.2)
BASOPHILS NFR BLD AUTO: 0.6 %
BILIRUB DIRECT SERPL-MCNC: 0.1 MG/DL (ref 0–0.2)
BILIRUB SERPL-MCNC: 0.8 MG/DL (ref 0.2–1.3)
CREAT SERPL-MCNC: 0.68 MG/DL (ref 0.52–1.04)
CRP SERPL-MCNC: 4 MG/L (ref 0–8)
DIFFERENTIAL METHOD BLD: NORMAL
EOSINOPHIL # BLD AUTO: 0.2 10E9/L (ref 0–0.7)
EOSINOPHIL NFR BLD AUTO: 2.6 %
ERYTHROCYTE [DISTWIDTH] IN BLOOD BY AUTOMATED COUNT: 13.3 % (ref 10–15)
ERYTHROCYTE [SEDIMENTATION RATE] IN BLOOD BY WESTERGREN METHOD: 18 MM/H (ref 0–30)
GFR SERPL CREATININE-BSD FRML MDRD: 85 ML/MIN/1.7M2
HBV CORE AB SERPL QL IA: NONREACTIVE
HBV SURFACE AG SERPL QL IA: NONREACTIVE
HCT VFR BLD AUTO: 43.3 % (ref 35–47)
HGB BLD-MCNC: 14.1 G/DL (ref 11.7–15.7)
LYMPHOCYTES # BLD AUTO: 1.6 10E9/L (ref 0.8–5.3)
LYMPHOCYTES NFR BLD AUTO: 25.6 %
MCH RBC QN AUTO: 30 PG (ref 26.5–33)
MCHC RBC AUTO-ENTMCNC: 32.6 G/DL (ref 31.5–36.5)
MCV RBC AUTO: 92 FL (ref 78–100)
MONOCYTES # BLD AUTO: 0.7 10E9/L (ref 0–1.3)
MONOCYTES NFR BLD AUTO: 10.7 %
NEUTROPHILS # BLD AUTO: 3.8 10E9/L (ref 1.6–8.3)
NEUTROPHILS NFR BLD AUTO: 60.5 %
PLATELET # BLD AUTO: 199 10E9/L (ref 150–450)
PROT SERPL-MCNC: 7.8 G/DL (ref 6.8–8.8)
RBC # BLD AUTO: 4.7 10E12/L (ref 3.8–5.2)
WBC # BLD AUTO: 6.3 10E9/L (ref 4–11)

## 2018-01-22 PROCEDURE — 87340 HEPATITIS B SURFACE AG IA: CPT | Performed by: INTERNAL MEDICINE

## 2018-01-22 PROCEDURE — 86704 HEP B CORE ANTIBODY TOTAL: CPT | Performed by: INTERNAL MEDICINE

## 2018-01-22 PROCEDURE — 80076 HEPATIC FUNCTION PANEL: CPT | Performed by: INTERNAL MEDICINE

## 2018-01-22 PROCEDURE — 82565 ASSAY OF CREATININE: CPT | Performed by: INTERNAL MEDICINE

## 2018-01-22 PROCEDURE — 85652 RBC SED RATE AUTOMATED: CPT | Performed by: INTERNAL MEDICINE

## 2018-01-22 PROCEDURE — 86140 C-REACTIVE PROTEIN: CPT | Performed by: INTERNAL MEDICINE

## 2018-01-22 PROCEDURE — 85025 COMPLETE CBC W/AUTO DIFF WBC: CPT | Performed by: INTERNAL MEDICINE

## 2018-01-22 PROCEDURE — 36415 COLL VENOUS BLD VENIPUNCTURE: CPT | Performed by: INTERNAL MEDICINE

## 2018-01-29 ENCOUNTER — OFFICE VISIT (OUTPATIENT)
Dept: RHEUMATOLOGY | Facility: CLINIC | Age: 73
End: 2018-01-29
Payer: COMMERCIAL

## 2018-01-29 VITALS
OXYGEN SATURATION: 98 % | HEART RATE: 80 BPM | HEIGHT: 65 IN | DIASTOLIC BLOOD PRESSURE: 75 MMHG | BODY MASS INDEX: 24.83 KG/M2 | WEIGHT: 149 LBS | SYSTOLIC BLOOD PRESSURE: 137 MMHG

## 2018-01-29 DIAGNOSIS — Z79.899 HIGH RISK MEDICATIONS (NOT ANTICOAGULANTS) LONG-TERM USE: ICD-10-CM

## 2018-01-29 DIAGNOSIS — M06.09 RHEUMATOID ARTHRITIS OF MULTIPLE SITES WITH NEGATIVE RHEUMATOID FACTOR (H): Primary | ICD-10-CM

## 2018-01-29 PROCEDURE — 99213 OFFICE O/P EST LOW 20 MIN: CPT | Performed by: INTERNAL MEDICINE

## 2018-01-29 RX ORDER — HYDROXYCHLOROQUINE SULFATE 200 MG/1
200 TABLET, FILM COATED ORAL DAILY
Qty: 90 TABLET | Refills: 1 | Status: SHIPPED | OUTPATIENT
Start: 2018-01-29 | End: 2018-10-30

## 2018-01-29 NOTE — NURSING NOTE
"Chief Complaint   Patient presents with     Arthritis     patient states it depends on what she does if she has pain or stiffness       Initial /75 (BP Location: Left arm, Patient Position: Chair, Cuff Size: Adult Regular)  Pulse 80  Ht 1.638 m (5' 4.5\")  Wt 67.6 kg (149 lb)  SpO2 98%  BMI 25.18 kg/m2 Estimated body mass index is 25.18 kg/(m^2) as calculated from the following:    Height as of this encounter: 1.638 m (5' 4.5\").    Weight as of this encounter: 67.6 kg (149 lb).  BP completed using cuff size: regular         RAPID3 (0-30) Cumulative Score  6.5          RAPID3 Weighted Score (divide #4 by 3 and that is the weighted score)  2.17         "

## 2018-01-29 NOTE — PROGRESS NOTES
Rheumatology Clinic Visit      Marga Clark MRN# [id number]   YOB: 1945 Age: 72 year old      Date of visit: 1/29/18   PCP: Dr. Tiarra Mak    Chief Complaint   Patient presents with:  Arthritis: patient states it depends on what she does if she has pain or stiffness      Assessment and Plan     1. Rheumatoid arthritis (RF negative [[facility]], CCP low positive): Dx'd 2011.  Has been followed by Prince Ritter ([facility]) and Frances ([place]).  Established with me on 10/30/2017. Previously on MTX (effective), prednisone (effective).  MTX and prednisone were stopped when she was doing well; inflammatory markers elevated so HCQ was started (per record review and patient interview).  Previously on HCQ 200mg BID and was doing well; reduced dose to 200mg daily with no change in symptoms.  Continue on HCQ 200mg daily for now and reassess in 6 months, sooner if needed.   - Continue hydroxychloroquine 200mg daily  - Ophthalmology referral for hydroxychloroquine toxicity monitoring    2. Left lateral epicondylitis, history: Has been physical therapy twice in the past and symptoms resolve as long as she continues the exercises.     Ms. Clark verbalized agreement with and understanding of the rational for the diagnosis and treatment plan.  All questions were answered to best of my ability and the patient's satisfaction. Ms. Clark was advised to contact the clinic with any questions that may arise after the clinic visit.      Thank you for involving me in the care of the patient    Return to clinic: 6 months      HPI   Marga Clark is a 72 year old female with a past medical history significant for inflammatory arthritis who presents for evaluation of inflammatory arthritis.    7/9/2013 rheumatology clinic note by Dr. Gama Ritter document psoriatic arthritis, and low positive CCP antibodies. Assessment and plan documents likely rheumatoid arthritis with positive antibodies. Doing well with  methotrexate.    5/11/2017 [facility] rheumatology clinic note by Dr. Daniels documents rheumatoid arthritis treated with Plaquenil 200 mg twice daily.     In October 2017, she reported that she was doing well with regard to rheumatoid arthritis.  She was doing well previously with methotrexate and prednisone; these were both discontinued but she had elevated inflammatory markers after that so hydroxychloroquine was started.  She continued to do well with hydroxychloroquine.  At that clinic visit, hydroxychloroquine was reduced from 200 mg twice daily to 200 mg daily.    Today, she reports doing well.  Morning stiffness for no more than a couple minutes.  Every now and then she dropped something but she is not sure why.  The dropping of items does not occur frequently.  No joint pain.  No joint swelling.  No gelling phenomenon.  Good  strength.    Denies fevers, chills, nausea, vomiting, constipation, diarrhea. No abdominal pain. No chest pain/pressure, palpitations, or shortness of breath. No LE swelling. No neck pain. No oral or nasal sores.  No rash. No sicca symptoms.     Tobacco: 0.5 ppd  EtOH: Occasional  Drugs: None    ROS   GEN: No fevers, chills, or night sweats  SKIN: No itching, rashes, sores  HEENT: No oral or nasal ulcers.  CV: No chest pain, pressure, palpitations, or dyspnea on exertion.  PULM: No SOB, wheeze, cough.  GI: No nausea, vomiting, constipation, diarrhea. No blood in stool. No abdominal pain.  : No blood in urine.  MSK: See HPI.  NEURO: No numbness, tingling, or weakness.  EXT: No LE swelling  PSYCH: Negative    Active Problem List     Patient Active Problem List   Diagnosis     CARDIOVASCULAR SCREENING; LDL GOAL LESS THAN 130     Advanced directives, counseling/discussion     High risk medications (not anticoagulants) long-term use     Inflammatory arthritis     Steroid long-term use     Past Medical History     Past Medical History:   Diagnosis Date     Inflammatory arthritis  "10/31/2011     Past Surgical History   History reviewed. No pertinent surgical history.  Allergy   No Known Allergies  Current Medication List     Current Outpatient Prescriptions   Medication Sig     hydroxychloroquine (PLAQUENIL) 200 MG tablet Take 1 tablet (200 mg) by mouth daily     triamcinolone (KENALOG) 0.5 % cream Apply to rash on feet bid til clear     fluticasone (FLONASE) 50 MCG/ACT spray Spray 2 sprays into both nostrils daily (Patient not taking: Reported on 10/30/2017)     benzonatate (TESSALON) 200 MG capsule Take 1 capsule (200 mg) by mouth 3 times daily as needed for cough (Patient not taking: Reported on 10/30/2017)     No current facility-administered medications for this visit.          Social History   See HPI    Family History     Family History   Problem Relation Age of Onset     CEREBROVASCULAR DISEASE Mother      HEART DISEASE Father      DIABETES Maternal Grandfather      DIABETES Paternal Grandfather      Physical Exam     Temp Readings from Last 3 Encounters:   10/30/17 97.5  F (36.4  C) (Oral)   08/25/17 97.9  F (36.6  C) (Oral)   07/05/17 98.7  F (37.1  C) (Oral)     BP Readings from Last 5 Encounters:   01/29/18 137/75   10/30/17 154/82   08/25/17 148/62   07/05/17 138/64   10/30/13 139/73     Pulse Readings from Last 1 Encounters:   01/29/18 80     Resp Readings from Last 1 Encounters:   10/22/12 10     Estimated body mass index is 25.18 kg/(m^2) as calculated from the following:    Height as of this encounter: 1.638 m (5' 4.5\").    Weight as of this encounter: 67.6 kg (149 lb).    GEN: NAD  HEENT: MMM. Anicteric, noninjected sclera  CV: S1, S2. RRR. No m/r/g.  PULM: CTA bilaterally. No w/c.  MSK:  Hands, wrists, and right elbow without synovial swelling, increased warmth, tenderness to palpation, or overlying erythema.  Negative MCP squeeze.  Normal  strength. Small Heberden's nodes present, bilaterally. Left lateral elbow mildly tender to direct palpation. Bilateral shoulders " nontender to palpation and without swelling.  Bilateral hips nontender to direct palpation. Knees, ankles, and feet without swelling, increased warmth, tenderness to palpation, or overlying erythema. Negative MTP squeeze.     NEURO: UE and LE strengths 5/5 and equal bilaterally.   SKIN: No rash  EXT: No LE edema  PSYCH: Alert. Appropriate.    Labs / Imaging (select studies)   RF/CCP  Recent Labs   Lab Test  10/17/11   1541   CCPABY  33*     AMANDA  Recent Labs   Lab Test  10/17/11   1541   JIMMY  <1.0 Interpretation:  Negative     ANCA  Recent Labs   Lab Test  10/17/11   1541   MPOAB  0     CBC  Recent Labs   Lab Test  01/22/18   0827  10/30/13   1003  07/09/13   0940   WBC  6.3  12.5*  11.2*   RBC  4.70  4.41  4.48   HGB  14.1  13.4  14.0   HCT  43.3  41.1  42.9   MCV  92  93  96   RDW  13.3  13.5  13.8   PLT  199  247  217   MCH  30.0  30.4  31.2   MCHC  32.6  32.6  32.6   NEUTROPHIL  60.5  80.5  81.0   LYMPH  25.6  12.2  10.2   MONOCYTE  10.7  5.5  6.7   EOSINOPHIL  2.6  1.2  1.2   BASOPHIL  0.6  0.6  0.9   ANEU  3.8  10.0*  9.2*   ALYM  1.6  1.5  1.1   MORENITA  0.7  0.7  0.7   AEOS  0.2  0.2  0.1   ABAS  0.0  0.1  0.1     CMP  Recent Labs   Lab Test  01/22/18   0827  07/14/17   0806  10/30/13   1003  07/09/13   0940  05/03/13   0904   10/17/11   1541   NA   --    --   140   --    --    --   140   POTASSIUM   --    --   3.9   --    --    --   4.1   CHLORIDE   --    --   104   --    --    --   105   CO2   --    --   27   --    --    --   24   ANIONGAP   --    --   9   --    --    --   11   GLC   --   80  83   --    --    --   88   BUN   --    --   12   --    --    --   15   CR  0.68   --   0.62  0.63  0.63   < >  0.59   GFRESTIMATED  85   --   >90  >90  >90   < >  >90   GFRESTBLACK  >90   --   >90  >90  >90   < >  >90   JOANNE   --    --   9.1   --    --    --   9.8   BILITOTAL  0.8   --   0.8  0.9  0.9   < >  0.4   ALBUMIN  4.0   --   3.9  3.9  4.1   < >  4.1   PROTTOTAL  7.8   --   7.4  7.3  7.4   < >  8.3   ALKPHOS  91    --   88  83  91   < >  131   AST  38   --   43  48*  49*   < >  38   ALT  24   --   29  26  37   < >  14    < > = values in this interval not displayed.     Calcium/VitaminD  Recent Labs   Lab Test  10/30/13   1003  10/17/11   1541   JOANNE  9.1  9.8     ESR/CRP  Recent Labs   Lab Test  01/22/18   0827  07/09/13   0940  05/03/13   0904   10/17/11   1541  08/19/11   1123   SED  18   --    --    --   65*  20   CRP  4.0  7.6  12.2*   < >   --   16.3*    < > = values in this interval not displayed.     CK/Aldolase  Recent Labs   Lab Test  10/17/11   1541   CKT  58     TSH/T4  Recent Labs   Lab Test  10/17/11   1541   TSH  1.29     Hepatitis B  Recent Labs   Lab Test  01/22/18   0827   HBCAB  Nonreactive   HEPBANG  Nonreactive     Hepatitis C  Recent Labs   Lab Test  10/31/11   0913   HCVAB  Negative     Tuberculosis Screening  Recent Labs   Lab Test  10/31/11   0915   TBRSLT  Negative   TBAGN  0.00     Immunization History     Immunization History   Administered Date(s) Administered     Pneumo Conj 13-V (2010&after) 10/30/2017     Pneumococcal 23 valent 10/11/2011     TDAP Vaccine (Adacel) 01/10/2007, 07/05/2017     Zoster vaccine, live 07/28/2009          Chart documentation done in part with Dragon Voice recognition Software. Although reviewed after completion, some word and grammatical error may remain.    Kody Urias MD

## 2018-06-17 NOTE — MR AVS SNAPSHOT
After Visit Summary   1/29/2018    Marga Clark    MRN: [id number]           Patient Information     Date Of Birth          1945        Visit Information        Provider Department      1/29/2018 8:40 AM Kody Urias MD [facility]        Today's Diagnoses     Rheumatoid arthritis of multiple sites with negative rheumatoid factor (H)    -  1    High risk medications (not anticoagulants) long-term use           Follow-ups after your visit        Additional Services     OPHTHALMOLOGY ADULT REFERRAL       Your provider has referred you to:    [facility]: [facility] [phone]   [email or web address]    Reason for referral: Hydroxychloroquine (plaquenil) toxicity monitoring.     Please be aware that coverage of these services is subject to the terms and limitations of your health insurance plan.  Call member services at your health plan with any benefit or coverage questions.      Please bring the following to your appointment:  >>   Any x-rays, CTs or MRIs which have been performed.  Contact the facility where they were done to arrange for  prior to your scheduled appointment.  Any new CT, MRI or other procedures ordered by your specialist must be performed at a [facility] facility or coordinated by your clinic's referral office.    >>   List of current medications   >>   This referral request   >>   Any documents/labs given to you for this referral                  Your next 10 appointments already scheduled     Jul 16, 2018  8:40 AM CDT   Return Visit with Kody Urias MD   [facility] ([facility] [place])    [address]  [place] [address] [phone]              Who to contact     If you have questions or need follow up information about today's clinic visit or your schedule please contact [facility] directly at [phone].  Normal or non-critical lab and imaging results will be  "communicated to you by MyChart, letter or phone within 4 business days after the clinic has received the results. If you do not hear from us within 7 days, please contact the clinic through [employer] or phone. If you have a critical or abnormal lab result, we will notify you by phone as soon as possible.  Submit refill requests through [employer] or call your pharmacy and they will forward the refill request to us. Please allow 3 business days for your refill to be completed.          Additional Information About Your Visit        [employer] Information     [employer] lets you send messages to your doctor, view your test results, renew your prescriptions, schedule appointments and more. To sign up, go to [email or web address] . Click on \"Log in\" on the left side of the screen, which will take you to the Welcome page. Then click on \"Sign up Now\" on the right side of the page.     You will be asked to enter the access code listed below, as well as some personal information. Please follow the directions to create your username and password.     Your access code is: [email or web address]  Expires: 2018  9:06 AM     Your access code will  in 90 days. If you need help or a new code, please call your [facility] clinic or [phone].        Care EveryWhere ID     This is your Care EveryWhere ID. This could be used by other organizations to access your [facility] medical records  [id number]        Your Vitals Were     Pulse Height Pulse Oximetry BMI (Body Mass Index)          80 1.638 m (5' 4.5\") 98% 25.18 kg/m2         Blood Pressure from Last 3 Encounters:   18 137/75   10/30/17 154/82   17 148/62    Weight from Last 3 Encounters:   18 67.6 kg (149 lb)   10/30/17 66.9 kg (147 lb 6.4 oz)   17 68.9 kg (152 lb)              We Performed the Following     OPHTHALMOLOGY ADULT REFERRAL          Where to get your medicines      These medications were sent to [facility] [employer] [facility] [id number] - [facility] [address] " [facility] AT [facility]  [address] [facility], [place] [address]    Hours:  24-hours Phone:  [phone]     hydroxychloroquine 200 MG tablet          Primary Care Provider Office Phone # Fax #    Tiarra Mak [phone] [address] [facility] [address]        Equal Access to Services     KEN BENITEZ : Hadii aad ku hadasho Soomaali, waaxda luqadaha, qaybta kaalmada adeegyada, waxay idiin hayaan adeeg kharash la'aan . So [facility] [phone].    ATENCIÓN: Si habla esploco, tiene a saldivar disposición servicios gratuitos de asistencia lingüística. Manoharame al [phone].    We comply with applicable federal civil rights laws and [place] laws. We do not discriminate on the basis of race, color, national origin, age, disability, sex, sexual orientation, or gender identity.            Thank you!     Thank you for choosing [facility]  for your care. Our goal is always to provide you with excellent care. Hearing back from our patients is one way we can continue to improve our services. Please take a few minutes to complete the written survey that you may receive in the mail after your visit with us. Thank you!             Your Updated Medication List - Protect others around you: Learn how to safely use, store and throw away your medicines at www.disposemymeds.org.          This list is accurate as of 1/29/18  9:06 AM.  Always use your most recent med list.                   Brand Name Dispense Instructions for use Diagnosis    benzonatate 200 MG capsule    TESSALON    21 capsule    Take 1 capsule (200 mg) by mouth 3 times daily as needed for cough    Cough       fluticasone 50 MCG/ACT spray    FLONASE    16 g    Spray 2 sprays into both nostrils daily    Acute sinusitis with symptoms > 10 days       hydroxychloroquine 200 MG tablet    PLAQUENIL    90 tablet    Take 1 tablet (200 mg) by mouth daily    Rheumatoid arthritis of multiple sites with  negative rheumatoid factor (H)       triamcinolone 0.5 % cream    KENALOG    30 g    Apply to rash on feet bid til clear    Vesicular rash          17-Jun-2018 06:32

## 2018-06-25 ENCOUNTER — OFFICE VISIT (OUTPATIENT)
Dept: OPHTHALMOLOGY | Facility: CLINIC | Age: 73
End: 2018-06-25
Payer: COMMERCIAL

## 2018-06-25 DIAGNOSIS — H40.1121 PRIMARY OPEN ANGLE GLAUCOMA OF LEFT EYE, MILD STAGE: ICD-10-CM

## 2018-06-25 DIAGNOSIS — Z79.899 HIGH RISK MEDICATIONS (NOT ANTICOAGULANTS) LONG-TERM USE: Primary | ICD-10-CM

## 2018-06-25 DIAGNOSIS — M19.90 INFLAMMATORY ARTHRITIS: ICD-10-CM

## 2018-06-25 PROCEDURE — 92004 COMPRE OPH EXAM NEW PT 1/>: CPT | Performed by: STUDENT IN AN ORGANIZED HEALTH CARE EDUCATION/TRAINING PROGRAM

## 2018-06-25 PROCEDURE — 92134 CPTRZ OPH DX IMG PST SGM RTA: CPT | Performed by: STUDENT IN AN ORGANIZED HEALTH CARE EDUCATION/TRAINING PROGRAM

## 2018-06-25 PROCEDURE — 92083 EXTENDED VISUAL FIELD XM: CPT | Performed by: STUDENT IN AN ORGANIZED HEALTH CARE EDUCATION/TRAINING PROGRAM

## 2018-06-25 RX ORDER — LATANOPROST 50 UG/ML
1 SOLUTION/ DROPS OPHTHALMIC AT BEDTIME
Qty: 1 BOTTLE | Refills: 12 | Status: SHIPPED | OUTPATIENT
Start: 2018-06-25 | End: 2019-11-25

## 2018-06-25 ASSESSMENT — REFRACTION_WEARINGRX
OD_CYLINDER: +0.75
OS_ADD: +2.50
OS_SPHERE: +1.75
OD_SPHERE: +2.00
OD_ADD: +2.50
OD_AXIS: 004
OS_AXIS: 002
OS_CYLINDER: +1.00
SPECS_TYPE: PAL

## 2018-06-25 ASSESSMENT — TONOMETRY
OS_IOP_MMHG: 21
OS_IOP_MMHG: 19
OD_IOP_MMHG: 21
IOP_METHOD: ICARE
IOP_METHOD: APPLANATION
OD_IOP_MMHG: 19

## 2018-06-25 ASSESSMENT — VISUAL ACUITY
METHOD: SNELLEN - LINEAR
OS_CC+: -2
OS_CC: 20/20
OD_CC+: -1
OD_CC: 20/20

## 2018-06-25 ASSESSMENT — SLIT LAMP EXAM - LIDS: COMMENTS: NORMAL

## 2018-06-25 ASSESSMENT — EXTERNAL EXAM - LEFT EYE: OS_EXAM: NORMAL

## 2018-06-25 ASSESSMENT — EXTERNAL EXAM - RIGHT EYE: OD_EXAM: NORMAL

## 2018-06-25 NOTE — LETTER
6/25/2018         RE: Marga Clark  [address] [address]        Dear Colleague,    Thank you for referring your patient, Marga Clark, to the [facility].     Her Plaquenil eye tests were normal, however I believe she has early glaucoma and started her on an eyedrop. I asked that she follow up in our clinic in 6 weeks. Please see a copy of my visit note below.     Current Eye Medications:  None     Subjective:  Plaquenil check, referred by Dr. Kody Urias @ [facility]. Vision is doing well both eyes distance and near. No eye pain or discomfort in either eye.      Objective:  See Ophthalmology Exam.       Assessment:  Marga Clark is a 72 year old female who presents with:   Encounter Diagnoses   Name Primary?     High risk medications (not anticoagulants) long-term use Macular OCT within normal limits both eyes  Started Plaquenil around 7/2014.     Inflammatory arthritis      Primary open angle glaucoma of left eye, mild stage FH: mom and dad with glaucoma  OCT optic nerve: borderline sup right eye, thin S&T left eye   Gaitan visual field (HVF) 24-2: within normal limits right eye, mild scattered loss left eye.  Recommend starting latanoprost both eyes      Plan:  Start Latanoprost (teal top) at bedtime both eyes  Return in 6 weeks for eye pressure check    Nora Oviedo MD  [phone]          Again, thank you for allowing me to participate in the care of your patient.        Sincerely,        Nora Oviedo MD

## 2018-06-25 NOTE — PROGRESS NOTES
Current Eye Medications:  None     Subjective:  Plaquenil check, referred by Dr. Kody Urias @ [facility]. Vision is doing well both eyes distance and near. No eye pain or discomfort in either eye.      Objective:  See Ophthalmology Exam.       Assessment:  Marga Clark is a 72 year old female who presents with:   Encounter Diagnoses   Name Primary?     High risk medications (not anticoagulants) long-term use Macular OCT within normal limits both eyes  Started Plaquenil around 7/2014.     Inflammatory arthritis      Primary open angle glaucoma of left eye, mild stage FH: mom and dad with glaucoma  OCT optic nerve: borderline sup right eye, thin S&T left eye   Gaitan visual field (HVF) 24-2: within normal limits right eye, mild scattered loss left eye.  Recommend starting latanoprost both eyes      Plan:  Start Latanoprost (teal top) at bedtime both eyes  Return in 6 weeks for eye pressure check    Nora Oviedo MD  [phone]

## 2018-06-25 NOTE — MR AVS SNAPSHOT
After Visit Summary   6/25/2018    Marga Clark    MRN: [id number]           Patient Information     Date Of Birth          1945        Visit Information        Provider Department      6/25/2018 3:30 PM Nora Oviedo MD [facility]        Today's Diagnoses     High risk medications (not anticoagulants) long-term use    -  1    Inflammatory arthritis        Primary open angle glaucoma of left eye, mild stage          Care Instructions    Start Latanoprost (teal top) at bedtime both eyes  Return in 6 weeks for eye pressure check    Nora Oviedo MD  [phone]            Follow-ups after your visit        Follow-up notes from your care team     Return in about 6 weeks (around 8/6/2018) for IOP check.      Your next 10 appointments already scheduled     Jul 16, 2018  8:40 AM CDT   Return Visit with Kody Urias MD   [facility] [place] ([facility])    [address] [address] [phone]              Who to contact     If you have questions or need follow up information about today's clinic visit or your schedule please contact [facility] directly at [phone].  Normal or non-critical lab and imaging results will be communicated to you by MyChart, letter or phone within 4 business days after the clinic has received the results. If you do not hear from us within 7 days, please contact the clinic through MyChart or phone. If you have a critical or abnormal lab result, we will notify you by phone as soon as possible.  Submit refill requests through [employer] or call your pharmacy and they will forward the refill request to us. Please allow 3 business days for your refill to be completed.          Additional Information About Your Visit        Care EveryWhere ID     This is your Care EveryWhere ID. This could be used by other organizations to access your [facility] medical records  [id number]         Blood  Pressure from Last 3 Encounters:   01/29/18 137/75   10/30/17 154/82   08/25/17 148/62    Weight from Last 3 Encounters:   01/29/18 67.6 kg (149 lb)   10/30/17 66.9 kg (147 lb 6.4 oz)   08/25/17 68.9 kg (152 lb)              We Performed the Following     HC COMPUTERIZED OPHTHALMIC IMAGING RETINA     VISUAL FIELDS EXTENSIVE          Today's Medication Changes          These changes are accurate as of 6/25/18  4:31 PM.  If you have any questions, ask your nurse or doctor.               Start taking these medicines.        Dose/Directions    latanoprost 0.005 % ophthalmic solution   Commonly known as:  XALATAN   Used for:  Primary open angle glaucoma of left eye, mild stage   Started by:  Nora Oviedo MD        Dose:  1 drop   Apply 1 drop to eye At Bedtime   Quantity:  1 Bottle   Refills:  12            Where to get your medicines      These medications were sent to [facility] [address] [place] [address] & [facility]  [address] [facility] [address]    Hours:  24-hours Phone:  [phone]     latanoprost 0.005 % ophthalmic solution                Primary Care Provider Office Phone # Fax #    Tiarra Mak [phone] [address] [facility] [address]        Equal Access to Services     KORI BENITEZ AH: Hadii aad ku hadasho Soomaali, waaxda luqadaha, qaybta kaalmada adeegyada, edi isaacs. So [facility] [phone].    ATENCIÓN: Si habla español, tiene a saldivar disposición servicios gratuitos de asistencia lingüística. Re al [phone].    We comply with applicable federal civil rights laws and [place] laws. We do not discriminate on the basis of race, color, national origin, age, disability, sex, sexual orientation, or gender identity.            Thank you!     Thank you for choosing [facility]  for your care. Our goal is always to provide you with excellent care. Hearing  back from our patients is one way we can continue to improve our services. Please take a few minutes to complete the written survey that you may receive in the mail after your visit with us. Thank you!             Your Updated Medication List - Protect others around you: Learn how to safely use, store and throw away your medicines at www.disposemymeds.org.          This list is accurate as of 6/25/18  4:31 PM.  Always use your most recent med list.                   Brand Name Dispense Instructions for use Diagnosis    benzonatate 200 MG capsule    TESSALON    21 capsule    Take 1 capsule (200 mg) by mouth 3 times daily as needed for cough    Cough       fluticasone 50 MCG/ACT spray    FLONASE    16 g    Spray 2 sprays into both nostrils daily    Acute sinusitis with symptoms > 10 days       hydroxychloroquine 200 MG tablet    PLAQUENIL    90 tablet    Take 1 tablet (200 mg) by mouth daily    Rheumatoid arthritis of multiple sites with negative rheumatoid factor (H)       latanoprost 0.005 % ophthalmic solution    XALATAN    1 Bottle    Apply 1 drop to eye At Bedtime    Primary open angle glaucoma of left eye, mild stage       triamcinolone 0.5 % cream    KENALOG    30 g    Apply to rash on feet bid til clear    Vesicular rash

## 2018-08-07 ENCOUNTER — OFFICE VISIT (OUTPATIENT)
Dept: OPHTHALMOLOGY | Facility: CLINIC | Age: 73
End: 2018-08-07
Payer: COMMERCIAL

## 2018-08-07 DIAGNOSIS — H40.1121 PRIMARY OPEN ANGLE GLAUCOMA OF LEFT EYE, MILD STAGE: Primary | ICD-10-CM

## 2018-08-07 PROCEDURE — 92012 INTRM OPH EXAM EST PATIENT: CPT | Performed by: STUDENT IN AN ORGANIZED HEALTH CARE EDUCATION/TRAINING PROGRAM

## 2018-08-07 ASSESSMENT — VISUAL ACUITY
OS_CC+: +2
OD_CC+: -2
OD_CC: 20/20
CORRECTION_TYPE: GLASSES
OS_CC: 20/25
METHOD: SNELLEN - LINEAR

## 2018-08-07 ASSESSMENT — SLIT LAMP EXAM - LIDS: COMMENTS: NORMAL

## 2018-08-07 ASSESSMENT — EXTERNAL EXAM - LEFT EYE: OS_EXAM: NORMAL

## 2018-08-07 ASSESSMENT — TONOMETRY
IOP_METHOD: APPLANATION
OD_IOP_MMHG: 18
OS_IOP_MMHG: 18

## 2018-08-07 ASSESSMENT — EXTERNAL EXAM - RIGHT EYE: OD_EXAM: NORMAL

## 2018-08-07 NOTE — LETTER
8/7/2018         RE: Marga Clark  [address] [facility] [address]        Dear Colleague,    Thank you for referring your patient, Marga Clark, to the [facility].    Her eye exam is stable.   Please see a copy of my visit note below.     Current Eye Medications:  Latanoprost both eyes every evening.  Last drops:  9:15pm.     Subjective:  Follow up Open Angle Glaucoma.  Patient is here for a pressure check.  Tolerating the drops.  No vision changes or concerns.      Objective:  See Ophthalmology Exam.      Assessment:  Marga Clark is a 72 year old female who presents with:   Encounter Diagnosis   Name Primary?     Primary open angle glaucoma of left eye, mild stage Intraocular pressure from 21/21 to 18/18 on latanoprost.      Plan:  Continue same medications  24-2 HVF and OCT macula AND optic nerve in 11 months    Nora Oviedo MD  [phone]    .         Again, thank you for allowing me to participate in the care of your patient.        Sincerely,        Nora Oviedo MD

## 2018-08-07 NOTE — MR AVS SNAPSHOT
After Visit Summary   8/7/2018    Marga Clark    MRN: [id number]           Patient Information     Date Of Birth          1945        Visit Information        Provider Department      8/7/2018 8:45 AM Nora Oviedo MD [facility]        Today's Diagnoses     Primary open angle glaucoma of left eye, mild stage    -  1      Care Instructions    Continue same medications    Nora Oviedo MD  [phone]            Follow-ups after your visit        Follow-up notes from your care team     Return in about 11 months (around 7/7/2019) for Plaquenil 10-2 HVF and OCT.      Your next 10 appointments already scheduled     Dec 03, 2018 10:20 AM CST   Return Visit with Kody Urias MD   [facility] [place] ([facility])    [address] [address] [phone]              Who to contact     If you have questions or need follow up information about today's clinic visit or your schedule please contact [facility] directly at [phone].  Normal or non-critical lab and imaging results will be communicated to you by MyChart, letter or phone within 4 business days after the clinic has received the results. If you do not hear from us within 7 days, please contact the clinic through MyChart or phone. If you have a critical or abnormal lab result, we will notify you by phone as soon as possible.  Submit refill requests through [email or web address] or call your pharmacy and they will forward the refill request to us. Please allow 3 business days for your refill to be completed.          Additional Information About Your Visit        Care EveryWhere ID     This is your Care EveryWhere ID. This could be used by other organizations to access your [place] medical records  [id number]         Blood Pressure from Last 3 Encounters:   01/29/18 137/75   10/30/17 154/82   08/25/17 148/62    Weight from Last 3 Encounters:   01/29/18 67.6 kg (149 lb)    10/30/17 66.9 kg (147 lb 6.4 oz)   08/25/17 68.9 kg (152 lb)              Today, you had the following     No orders found for display       Primary Care Provider Office Phone # Fax #    Tiarra Mak [phone] [address] [address] [facility] [address]        Equal Access to Services     [facility]: Hadii aad ku hadasho Soomaali, waaxda luqadaha, qaybta kaalmada adeegyada, waxay idiin hayaan adeeg kharash la'aan ah. So [facility] [phone].    ATENCIÓN: Si habla esploco, tiene a saldivar disposición servicios gratuitos de asistencia lingüística. [facility] [phone].    We comply with applicable federal civil rights laws and [place] laws. We do not discriminate on the basis of race, color, national origin, age, disability, sex, sexual orientation, or gender identity.            Thank you!     [facility] you [facility]  [facility] your [facility]. Our goal is always to provide you with excellent care. Hearing back from our patients is one way we can continue to improve our services. Please take a few minutes to complete the written survey that you may receive in the mail after your visit with us. Thank you!             Your Updated Medication List - Protect others around you: Learn how to safely use, store and throw away your medicines at www.disposemymeds.org.          This list is accurate as of 8/7/18  9:21 AM.  Always use your most recent med list.                   Brand Name Dispense Instructions for use Diagnosis    benzonatate 200 MG capsule    TESSALON    21 capsule    Take 1 capsule (200 mg) by mouth 3 times daily as needed for cough    Cough       fluticasone 50 MCG/ACT spray    FLONASE    16 g    Spray 2 sprays into both nostrils daily    Acute sinusitis with symptoms > 10 days       hydroxychloroquine 200 MG tablet    PLAQUENIL    90 tablet    Take 1 tablet (200 mg) by mouth daily    Rheumatoid arthritis of multiple sites with negative rheumatoid factor (H)       latanoprost  0.005 % ophthalmic solution    XALATAN    1 Bottle    Apply 1 drop to eye At Bedtime    Primary open angle glaucoma of left eye, mild stage       triamcinolone 0.5 % cream    KENALOG    30 g    Apply to rash on feet bid til clear    Vesicular rash

## 2018-10-30 DIAGNOSIS — M06.09 RHEUMATOID ARTHRITIS OF MULTIPLE SITES WITH NEGATIVE RHEUMATOID FACTOR (H): ICD-10-CM

## 2018-10-30 NOTE — TELEPHONE ENCOUNTER
Requested Prescriptions   Pending Prescriptions Disp Refills     hydroxychloroquine (PLAQUENIL) 200 MG tablet 90 tablet 1     Sig: Take 1 tablet (200 mg) by mouth daily    There is no refill protocol information for this order      Last Written Prescription Date:  7-23-18  Last Fill Quantity: 90,  # refills: 1   Last office visit: 1/29/2018 with prescribing provider:  1-29-18   Future Office Visit:   Next 5 appointments (look out 90 days)     Dec 03, 2018 10:20 AM CST   Return Visit with Kody Urias MD   [facility] [place] ([facility] [place])    [address]  [place] [address]   [phone]

## 2018-10-30 NOTE — TELEPHONE ENCOUNTER
Left vm for pt to return call to [phone].  Need to see if patient has had recent eye exam.    Lencho Cota RN....10/30/2018 10:45 AM

## 2018-10-30 NOTE — TELEPHONE ENCOUNTER
Routing refill request to provider for review/approval because:  Drug not on the [facility] refill protocol     Tresa Spicer RN

## 2018-11-02 NOTE — TELEPHONE ENCOUNTER
LOV 1/29/18  No showed 7/16/18 apt  Has apt for 12/3/18  Last eye exam 6/25/18  Please advise    Lencho Cota RN....11/2/2018 11:34 AM

## 2018-11-06 RX ORDER — HYDROXYCHLOROQUINE SULFATE 200 MG/1
200 TABLET, FILM COATED ORAL DAILY
Qty: 90 TABLET | Refills: 0 | Status: SHIPPED | OUTPATIENT
Start: 2018-11-06 | End: 2018-12-03

## 2018-11-06 NOTE — TELEPHONE ENCOUNTER
Rheumatology team: Please call to notify Ms. Clark that hydroxychloroquine has been refilled.    Kody Urias MD  11/6/2018 4:32 PM

## 2018-11-06 NOTE — TELEPHONE ENCOUNTER
Patient is checking status of previous refill request. States she is out of medication. Please call soon.

## 2018-11-20 ENCOUNTER — DOCUMENTATION ONLY (OUTPATIENT)
Dept: LAB | Facility: CLINIC | Age: 73
End: 2018-11-20

## 2018-11-20 NOTE — PROGRESS NOTES
Left a detailed message for patient.  Stating that if labs are needed they would be done at the time of the follow up appt. Pre labs are not needed and if she has a lab appt it can be cancelled.     Robert Cosme

## 2018-11-20 NOTE — PROGRESS NOTES
Patient is scheduled for a Lab appointment on 11/28 for 'Urias labs'.  Please place orders needed. If the labs are not needed, please follow up with the patient.    Thank you,   Anika Ignacio/Zachery)

## 2018-11-20 NOTE — TELEPHONE ENCOUNTER
Rheumatology team: Please call to notify Ms. Clark that labs will be done at the time of the rheumatology clinic visit follow-up, if they are needed at that time.  Pre-visit labs are not needed and that lab appointment may be canceled if only for rheumatology reasons.    Kody Urias MD  11/20/2018 1:15 PM

## 2018-12-03 ENCOUNTER — OFFICE VISIT (OUTPATIENT)
Dept: RHEUMATOLOGY | Facility: CLINIC | Age: 73
End: 2018-12-03
Payer: COMMERCIAL

## 2018-12-03 VITALS
WEIGHT: 145.2 LBS | TEMPERATURE: 97.8 F | SYSTOLIC BLOOD PRESSURE: 148 MMHG | DIASTOLIC BLOOD PRESSURE: 79 MMHG | BODY MASS INDEX: 24.54 KG/M2 | OXYGEN SATURATION: 99 % | HEART RATE: 89 BPM

## 2018-12-03 DIAGNOSIS — M06.09 RHEUMATOID ARTHRITIS OF MULTIPLE SITES WITH NEGATIVE RHEUMATOID FACTOR (H): Primary | ICD-10-CM

## 2018-12-03 DIAGNOSIS — Z79.899 HIGH RISK MEDICATIONS (NOT ANTICOAGULANTS) LONG-TERM USE: ICD-10-CM

## 2018-12-03 DIAGNOSIS — M70.61 TROCHANTERIC BURSITIS OF BOTH HIPS: ICD-10-CM

## 2018-12-03 DIAGNOSIS — M70.62 TROCHANTERIC BURSITIS OF BOTH HIPS: ICD-10-CM

## 2018-12-03 PROCEDURE — 99214 OFFICE O/P EST MOD 30 MIN: CPT | Performed by: INTERNAL MEDICINE

## 2018-12-03 RX ORDER — HYDROXYCHLOROQUINE SULFATE 200 MG/1
TABLET, FILM COATED ORAL
Qty: 135 TABLET | Refills: 2 | Status: SHIPPED | OUTPATIENT
Start: 2018-12-03 | End: 2019-06-03

## 2018-12-03 ASSESSMENT — PAIN SCALES - GENERAL: PAINLEVEL: NO PAIN (0)

## 2018-12-03 NOTE — PROGRESS NOTES
Rheumatology Clinic Visit      Marga Clark MRN# [id number]   YOB: 1945 Age: 73 year old      Date of visit: 12/03/18   PCP: Dr. Tiarra Mak    Chief Complaint   Patient presents with:  Arthritis: 11 month follow up. things are going well. med refills      Assessment and Plan     1. Rheumatoid arthritis (RF negative [[facility]], CCP low positive): Dx'd 2011.  Has been followed by Prince Ritter ([place]) and Frances ([place]).  Established with me on 10/30/2017. Previously on MTX (effective), prednisone (effective).  MTX and prednisone were stopped when she was doing well; inflammatory markers elevated so HCQ was started (per record review and patient interview).  Previously on HCQ 200mg BID and was doing well; reduced dose to 200mg daily with no change in symptoms.   Currently on HCQ 200mg daily but symmetric synovitis of the MCPs.  Increase hydroxychloroquine.  - Increase hydroxychloroquine to 200mg daily, and an additional 200 mg every other day (last eye exam was on 6/25/2018)  - Labs in 6 months: CBC, Creatinine, Hepatic Panel, ESR, CRP     2. Left lateral epicondylitis, history: Has been physical therapy twice in the past and symptoms resolve as long as she continues the exercises.     3.  Bilateral trochanteric bursitis: Diagnosis and treatment options were discussed in detail today.  Symptoms are mild. Activity modification - she is going to try to not lay on them and see if that helps.    4. Elevated blood pressure:  Patient to follow up with Primary Care provider regarding elevated blood pressure.     5.  Vaccinations: Vaccinations reviewed with Ms. Clark.  Risks and benefits of vaccinations were discussed. Data and lack of data for shingrix reviewed.  CDC stance on shingrix when on moderate to high immunosuppression reviewed.  I explained that Shingrix is used off label when under 50 years old and that the safety and efficacy of the vaccine has not been tested in people younger  than 50 years old.   - Influenza: refused by patient  - [id number]: up to date  - [id number]: up to date  - Shingrix: discussed; she is going to consider receiving at a pharmacy    Ms. Clark verbalized agreement with and understanding of the rational for the diagnosis and treatment plan.  All questions were answered to best of my ability and the patient's satisfaction. Ms. Clark was advised to contact the clinic with any questions that may arise after the clinic visit.      Thank you for involving me in the care of the patient    Return to clinic: 6 months      HPI   Marga Clark is a 73 year old female with a past medical history significant for inflammatory arthritis who presents for follow-up of inflammatory arthritis.    7/9/2013 rheumatology clinic note by Dr. Gama Ritter document psoriatic arthritis, and low positive CCP antibodies. Assessment and plan documents likely rheumatoid arthritis with positive antibodies. Doing well with methotrexate.    5/11/2017 [facility] rheumatology clinic note by Dr. Daniels documents rheumatoid arthritis treated with Plaquenil 200 mg twice daily.     In October 2017, she reported that she was doing well with regard to rheumatoid arthritis.  She was doing well previously with methotrexate and prednisone; these were both discontinued but she had elevated inflammatory markers after that so hydroxychloroquine was started.  She continued to do well with hydroxychloroquine.  At that clinic visit, hydroxychloroquine was reduced from 200 mg twice daily to 200 mg daily.    1/29/2018: reported doing well.  Morning stiffness for no more than a couple minutes.  Every now and then she dropped something but she is not sure why.  The dropping of items does not occur frequently.  No joint pain.  No joint swelling.  No gelling phenomenon.  Good  strength.    7/16/2018: no show to scheduled clinic visit    Today, Ms. Clark reports that she is doing okay.  Some achiness in her  hands that is worse since reducing Plaquenil.  Morning stiffness for no more than 15-20 seconds.  Occasionally with some back pain after doing more activity.  Bilateral hips mildly painful when she lays on the affected side.  No joint swelling.  Tolerating Plaquenil well.    Denies fevers, chills, nausea, vomiting, constipation, diarrhea. No abdominal pain. No chest pain/pressure, palpitations, or shortness of breath. No LE swelling. No neck pain. No oral or nasal sores.  No rash. No sicca symptoms.     Tobacco: 0.5 ppd  EtOH: Occasional  Drugs: None    ROS   GEN: No fevers, chills, or night sweats  SKIN: No itching, rashes, sores  HEENT: No oral or nasal ulcers.  CV: No chest pain, pressure, palpitations, or dyspnea on exertion.  PULM: No SOB, wheeze, cough.  GI: No nausea, vomiting, constipation, diarrhea. No blood in stool. No abdominal pain.  : No blood in urine.  MSK: See HPI.  NEURO: No numbness, tingling, or weakness.  EXT: No LE swelling  PSYCH: Negative    Active Problem List     Patient Active Problem List   Diagnosis     CARDIOVASCULAR SCREENING; LDL GOAL LESS THAN 130     Advanced directives, counseling/discussion     High risk medications (not anticoagulants) long-term use     Inflammatory arthritis     Steroid long-term use     Past Medical History     Past Medical History:   Diagnosis Date     Inflammatory arthritis 10/31/2011     Past Surgical History   No past surgical history on file.  Allergy   No Known Allergies  Current Medication List     Current Outpatient Prescriptions   Medication Sig     hydroxychloroquine (PLAQUENIL) 200 MG tablet Take 1 tablet (200 mg) by mouth daily     benzonatate (TESSALON) 200 MG capsule Take 1 capsule (200 mg) by mouth 3 times daily as needed for cough (Patient not taking: Reported on 10/30/2017)     fluticasone (FLONASE) 50 MCG/ACT spray Spray 2 sprays into both nostrils daily (Patient not taking: Reported on 10/30/2017)     latanoprost (XALATAN) 0.005 %  "ophthalmic solution Apply 1 drop to eye At Bedtime (Patient not taking: Reported on 12/3/2018)     triamcinolone (KENALOG) 0.5 % cream Apply to rash on feet bid til clear (Patient not taking: Reported on 12/3/2018)     No current facility-administered medications for this visit.          Social History   See HPI    Family History     Family History   Problem Relation Age of Onset     Cerebrovascular Disease Mother      Glaucoma Mother      Heart Disease Father      Diabetes Maternal Grandfather      Diabetes Paternal Grandfather      Macular Degeneration No family hx of      Physical Exam     Temp Readings from Last 3 Encounters:   12/03/18 97.8  F (36.6  C) (Oral)   10/30/17 97.5  F (36.4  C) (Oral)   08/25/17 97.9  F (36.6  C) (Oral)     BP Readings from Last 5 Encounters:   12/03/18 166/76   01/29/18 137/75   10/30/17 154/82   08/25/17 148/62   07/05/17 138/64     Pulse Readings from Last 1 Encounters:   12/03/18 89     Resp Readings from Last 1 Encounters:   10/22/12 10     Estimated body mass index is 24.54 kg/(m^2) as calculated from the following:    Height as of 1/29/18: 1.638 m (5' 4.5\").    Weight as of this encounter: 65.9 kg (145 lb 3.2 oz).    GEN: NAD  HEENT: MMM. Anicteric, noninjected sclera  CV: S1, S2. RRR. No m/r/g.  PULM: CTA bilaterally. No w/c.  MSK: Mild synovial swelling and tenderness palpation of the bilateral second-fourth MCPs.  PIPs without swelling or tenderness to palpation.  Wrists, elbows, shoulders, knees, ankles, and MTPs without swelling or tenderness to palpation.  Hips tender to direct palpation but not with range of motion.      NEURO: UE and LE strengths 5/5 and equal bilaterally.   SKIN: No rash  EXT: No LE edema  PSYCH: Alert. Appropriate.    Labs / Imaging (select studies)   RF/CCP  Recent Labs   Lab Test  10/17/11   1541   CCPABY  33*     AMANDA  Recent Labs   Lab Test  10/17/11   1541   JIMMY  <1.0 Interpretation:  Negative     ANCA  Recent Labs   Lab Test  10/17/11   1541 "   MPOAB  0     IgG  Recent Labs   Lab Test  10/17/11   1541   IGG  1570   IGA  328   IGM  124     CBC  Recent Labs   Lab Test  01/22/18   0827  10/30/13   1003  07/09/13   0940   WBC  6.3  12.5*  11.2*   RBC  4.70  4.41  4.48   HGB  14.1  13.4  14.0   HCT  43.3  41.1  42.9   MCV  92  93  96   RDW  13.3  13.5  13.8   PLT  199  247  217   MCH  30.0  30.4  31.2   MCHC  32.6  32.6  32.6   NEUTROPHIL  60.5  80.5  81.0   LYMPH  25.6  12.2  10.2   MONOCYTE  10.7  5.5  6.7   EOSINOPHIL  2.6  1.2  1.2   BASOPHIL  0.6  0.6  0.9   ANEU  3.8  10.0*  9.2*   ALYM  1.6  1.5  1.1   MORENITA  0.7  0.7  0.7   AEOS  0.2  0.2  0.1   ABAS  0.0  0.1  0.1     CMP  Recent Labs   Lab Test  01/22/18 0827 07/14/17   0806  10/30/13   1003  07/09/13   0940  05/03/13   0904   10/17/11   1541   NA   --    --   140   --    --    --   140   POTASSIUM   --    --   3.9   --    --    --   4.1   CHLORIDE   --    --   104   --    --    --   105   CO2   --    --   27   --    --    --   24   ANIONGAP   --    --   9   --    --    --   11   GLC   --   80  83   --    --    --   88   BUN   --    --   12   --    --    --   15   CR  0.68   --   0.62  0.63  0.63   < >  0.59   GFRESTIMATED  85   --   >90  >90  >90   < >  >90   GFRESTBLACK  >90   --   >90  >90  >90   < >  >90   JOANNE   --    --   9.1   --    --    --   9.8   BILITOTAL  0.8   --   0.8  0.9  0.9   < >  0.4   ALBUMIN  4.0   --   3.9  3.9  4.1   < >  4.1   PROTTOTAL  7.8   --   7.4  7.3  7.4   < >  8.3   ALKPHOS  91   --   88  83  91   < >  131   AST  38   --   43  48*  49*   < >  38   ALT  24   --   29  26  37   < >  14    < > = values in this interval not displayed.     Calcium/VitaminD  Recent Labs   Lab Test  10/30/13   1003  10/17/11   1541   JOANNE  9.1  9.8     ESR/CRP  Recent Labs   Lab Test  01/22/18   0827  07/09/13   0940  05/03/13   0904   10/17/11   1541  08/19/11   1123   SED  18   --    --    --   65*  20   CRP  4.0  7.6  12.2*   < >   --   16.3*    < > = values in this interval not  displayed.     CK/Aldolase  Recent Labs   Lab Test  10/17/11   1541   CKT  58     TSH/T4  Recent Labs   Lab Test  10/17/11   1541   TSH  1.29     Hepatitis B  Recent Labs   Lab Test  01/22/18   0827   HBCAB  Nonreactive   HEPBANG  Nonreactive     Hepatitis C  Recent Labs   Lab Test  10/31/11   0913   HCVAB  Negative     Tuberculosis Screening  Recent Labs   Lab Test  10/31/11   0915   TBRSLT  Negative   TBAGN  0.00     Immunization History     Immunization History   Administered Date(s) Administered     Pneumo Conj 13-V (2010&after) 10/30/2017     Pneumococcal 23 valent 10/11/2011     TDAP Vaccine (Adacel) 01/10/2007, 07/05/2017     Zoster vaccine, live 07/28/2009          Chart documentation done in part with Dragon Voice recognition Software. Although reviewed after completion, some word and grammatical error may remain.    Kody Urias MD

## 2018-12-03 NOTE — MR AVS SNAPSHOT
After Visit Summary   12/3/2018    Marga Clark    MRN: [id number]           Patient Information     Date Of Birth          1945        Visit Information        Provider Department      12/3/2018 10:20 AM Kody Urias MD [place] [facility]        Today's Diagnoses     Rheumatoid arthritis of multiple sites with negative rheumatoid factor (H)    -  1    High risk medications (not anticoagulants) long-term use        Trochanteric bursitis of both hips           Follow-ups after your visit        Follow-up notes from your care team     Return in about 6 months (around 6/3/2019).      Your next 10 appointments already scheduled     May 29, 2019  9:00 AM CDT   LAB with BE LAB   [facility] [place] ([facility])    [address]  [place] [address]   [phone]           Please do not eat 10-12 hours before your appointment if you are coming in fasting for labs on lipids, cholesterol, or glucose (sugar). This does not apply to pregnant women. Water, hot tea and black coffee (with nothing added) are okay. Do not drink other fluids, diet soda or chew gum.            Jun 03, 2019  9:00 AM CDT   Return Visit with Kody Urias MD   [facility] [place] ([facility])    [address]  [place] [address]   [phone]            Jul 09, 2019  8:15 AM CDT   Return Visit with Nora Oviedo MD   [facility] [place] ([facility] [place])    [address]  [place] [address] [phone]              Future tests that were ordered for you today     Open Future Orders        Priority Expected Expires Ordered    CBC with platelets differential Routine 5/27/2019 6/16/2019 12/3/2018    Creatinine Routine 5/27/2019 6/16/2019 12/3/2018    Hepatic panel Routine 5/27/2019 6/16/2019 12/3/2018    CRP inflammation Routine 5/27/2019 6/16/2019 12/3/2018    Erythrocyte sedimentation rate auto Routine 5/27/2019 6/16/2019  12/3/2018            Who to contact     If you have questions or need follow up information about today's clinic visit or your schedule please contact [facility] directly at [phone].  Normal or non-critical lab and imaging results will be communicated to you by MyChart, letter or phone within 4 business days after the clinic has received the results. If you do not hear from us within 7 days, please contact the clinic through MyChart or phone. If you have a critical or abnormal lab result, we will notify you by phone as soon as possible.  Submit refill requests through [employer] or call your pharmacy and they will forward the refill request to us. Please allow 3 business days for your refill to be completed.          Additional Information About Your Visit        Care EveryWhere ID     This is your Care EveryWhere ID. This could be used by other organizations to access your [place] medical records  [id number]        Your Vitals Were     Pulse Temperature Pulse Oximetry BMI (Body Mass Index)          89 97.8  F (36.6  C) (Oral) 99% 24.54 kg/m2         Blood Pressure from Last 3 Encounters:   12/03/18 166/76   01/29/18 137/75   10/30/17 154/82    Weight from Last 3 Encounters:   12/03/18 65.9 kg (145 lb 3.2 oz)   01/29/18 67.6 kg (149 lb)   10/30/17 66.9 kg (147 lb 6.4 oz)                 Today's Medication Changes          These changes are accurate as of 12/3/18 10:49 AM.  If you have any questions, ask your nurse or doctor.               These medicines have changed or have updated prescriptions.        Dose/Directions    hydroxychloroquine 200 MG tablet   Commonly known as:  PLAQUENIL   This may have changed:    - how much to take  - how to take this  - when to take this  - additional instructions   Used for:  Rheumatoid arthritis of multiple sites with negative rheumatoid factor (H)   Changed by:  Kody Urias MD        Hydroxychloroquine 200mg daily, with an additional 200mg every other day.    Quantity:  135 tablet   Refills:  2            Where to get your medicines      These medications were sent to [facility]/pharmacy #[id number] - [place], [place] - [address]   [address] , [place] [address]     Phone:  [phone]     hydroxychloroquine 200 MG tablet                Primary Care Provider Office Phone # Fax #    Tiarra Mak [phone] [address] [facility] [address]        Equal Access to Services     [facility] : Hadii aad ku hadasho Soomaali, waaxda luqadaha, qaybta kaalmada adeegyada, waxay idiin hayaan adeeg kharafaraz chin . So [facility] [phone].    ATENCIÓN: Si habla español, tiene a saldivar disposición servicios gratuitos de asistencia lingüística. [facility] [phone].    We comply with applicable federal civil rights laws and [place] laws. We do not discriminate on the basis of race, color, national origin, age, disability, sex, sexual orientation, or gender identity.            Thank you!     Thank you for choosing [facility]  for your care. Our goal is always to provide you with excellent care. Hearing back from our patients is one way we can continue to improve our services. Please take a few minutes to complete the written survey that you may receive in the mail after your visit with us. Thank you!             Your Updated Medication List - Protect others around you: Learn how to safely use, store and throw away your medicines at www.disposemymeds.org.          This list is accurate as of 12/3/18 10:49 AM.  Always use your most recent med list.                   Brand Name Dispense Instructions for use Diagnosis    benzonatate 200 MG capsule    TESSALON    21 capsule    Take 1 capsule (200 mg) by mouth 3 times daily as needed for cough    Cough       fluticasone 50 MCG/ACT nasal spray    FLONASE    16 g    Spray 2 sprays into both nostrils daily    Acute sinusitis with symptoms > 10 days       hydroxychloroquine 200 MG  tablet    PLAQUENIL    135 tablet    Hydroxychloroquine 200mg daily, with an additional 200mg every other day.    Rheumatoid arthritis of multiple sites with negative rheumatoid factor (H)       latanoprost 0.005 % ophthalmic solution    XALATAN    1 Bottle    Apply 1 drop to eye At Bedtime    Primary open angle glaucoma of left eye, mild stage       triamcinolone 0.5 % external cream    KENALOG    30 g    Apply to rash on feet bid til clear    Vesicular rash

## 2018-12-12 ENCOUNTER — TELEPHONE (OUTPATIENT)
Dept: RHEUMATOLOGY | Facility: CLINIC | Age: 73
End: 2018-12-12

## 2018-12-12 NOTE — TELEPHONE ENCOUNTER
Fax was received from [employer] stating a notice of denial of medicare part d prescription drug coverage for hydroxychloroquine 200 mg tablets.     Contacted [employer] who stated that the request was denied to lower the tier.  The next step would be to file an appeal, p: [phone].     Lencho Cota RN....12/12/2018 2:51 PM

## 2018-12-12 NOTE — TELEPHONE ENCOUNTER
Spoke with patient and discussed the situation with her.   Provided her phone number for senior linkage line, [phone] for financial assistance.    Lencho Cota RN....12/12/2018 3:49 PM

## 2019-05-14 NOTE — PROGRESS NOTES
"  SUBJECTIVE:   Marga Clark is a 73 year old female who presents to clinic today for the following   health issues:        Rash on foot    Skin tag consult    Pt with vesicular scaly rash on left heal. Has had it in the past  Used taclonex in the past which worked well but was over $200  Last time tried just the topical steroid and it worked but also dried out the heal  Will write rxs for both and she can see which one her insurance recovers    Pt has at least 5 lesion on her face/arms/chest she is concerned with  Will refer to derm for full body eval and can reassess her heal if it is not improvijng    Pt with RA, sees rheumatology    Pt due for mammogram. Gets completed at outside clinic. She knows she is overdue  Knows she is due for colonoscopy and has number to call    Additional history: as documented    Reviewed  and updated as needed this visit by clinical staff         Reviewed and updated as needed this visit by Provider         Labs reviewed in EPIC    ROS:  Constitutional, HEENT, cardiovascular, pulmonary, gi and gu systems are negative, except as otherwise noted.    OBJECTIVE:     /67   Pulse 86   Temp 98.8  F (37.1  C) (Oral)   Ht 1.638 m (5' 4.5\")   Wt 66.7 kg (147 lb)   BMI 24.84 kg/m    Body mass index is 24.84 kg/m .  GENERAL: healthy, alert and no distress  SKIN: vesicular, scaly erythematous rash on left heal, numerous skin lesions on face/arms/chest    Diagnostic Test Results:  none     ASSESSMENT/PLAN:     1. Rash  Trial of creams listed, follow-up with derm if not improving  - calcipotriene-betameth diprop (TACLONEX) 0.005-0.064 % external ointment; Apply daily to affected area til clear  Dispense: 60 g; Refill: 3  - triamcinolone (ARISTOCORT HP) 0.5 % external cream; Apply topically 2 times daily  Dispense: 30 g; Refill: 1    2. Skin lesion  Pt with lots of suspicious lesions for BCC, refer to derm  - DERMATOLOGY REFERRAL    3. Rheumatoid arthritis of multiple sites with " negative rheumatoid factor (H)  Per rheumatology          Tiarra Castillo MD  [facility]

## 2019-05-15 ENCOUNTER — OFFICE VISIT (OUTPATIENT)
Dept: FAMILY MEDICINE | Facility: CLINIC | Age: 74
End: 2019-05-15
Payer: COMMERCIAL

## 2019-05-15 VITALS
DIASTOLIC BLOOD PRESSURE: 67 MMHG | BODY MASS INDEX: 24.49 KG/M2 | WEIGHT: 147 LBS | SYSTOLIC BLOOD PRESSURE: 137 MMHG | HEART RATE: 86 BPM | HEIGHT: 65 IN | TEMPERATURE: 98.8 F

## 2019-05-15 DIAGNOSIS — M06.09 RHEUMATOID ARTHRITIS OF MULTIPLE SITES WITH NEGATIVE RHEUMATOID FACTOR (H): ICD-10-CM

## 2019-05-15 DIAGNOSIS — L98.9 SKIN LESION: Primary | ICD-10-CM

## 2019-05-15 DIAGNOSIS — R21 RASH: ICD-10-CM

## 2019-05-15 PROCEDURE — 99214 OFFICE O/P EST MOD 30 MIN: CPT | Performed by: FAMILY MEDICINE

## 2019-05-15 RX ORDER — CALCIPOTRIENE, BETAMETHASONE DIPROPIONATE 50; .643 UG/G; MG/G
OINTMENT TOPICAL
Qty: 60 G | Refills: 3 | Status: SHIPPED | OUTPATIENT
Start: 2019-05-15 | End: 2022-01-01

## 2019-05-15 RX ORDER — TRIAMCINOLONE ACETONIDE 5 MG/G
CREAM TOPICAL 2 TIMES DAILY
Qty: 30 G | Refills: 1 | Status: SHIPPED | OUTPATIENT
Start: 2019-05-15

## 2019-05-15 ASSESSMENT — MIFFLIN-ST. JEOR: SCORE: 1169.73

## 2019-05-29 DIAGNOSIS — M06.09 RHEUMATOID ARTHRITIS OF MULTIPLE SITES WITH NEGATIVE RHEUMATOID FACTOR (H): ICD-10-CM

## 2019-05-29 LAB
ALBUMIN SERPL-MCNC: 4 G/DL (ref 3.4–5)
ALP SERPL-CCNC: 92 U/L (ref 40–150)
ALT SERPL W P-5'-P-CCNC: 23 U/L (ref 0–50)
AST SERPL W P-5'-P-CCNC: 39 U/L (ref 0–45)
BASOPHILS # BLD AUTO: 0 10E9/L (ref 0–0.2)
BASOPHILS NFR BLD AUTO: 0.6 %
BILIRUB DIRECT SERPL-MCNC: 0.2 MG/DL (ref 0–0.2)
BILIRUB SERPL-MCNC: 0.7 MG/DL (ref 0.2–1.3)
CREAT SERPL-MCNC: 0.67 MG/DL (ref 0.52–1.04)
CRP SERPL-MCNC: <2.9 MG/L (ref 0–8)
DIFFERENTIAL METHOD BLD: NORMAL
EOSINOPHIL # BLD AUTO: 0.2 10E9/L (ref 0–0.7)
EOSINOPHIL NFR BLD AUTO: 2.1 %
ERYTHROCYTE [DISTWIDTH] IN BLOOD BY AUTOMATED COUNT: 13.4 % (ref 10–15)
ERYTHROCYTE [SEDIMENTATION RATE] IN BLOOD BY WESTERGREN METHOD: 20 MM/H (ref 0–30)
GFR SERPL CREATININE-BSD FRML MDRD: 87 ML/MIN/{1.73_M2}
HCT VFR BLD AUTO: 41.8 % (ref 35–47)
HGB BLD-MCNC: 13.4 G/DL (ref 11.7–15.7)
LYMPHOCYTES # BLD AUTO: 1.9 10E9/L (ref 0.8–5.3)
LYMPHOCYTES NFR BLD AUTO: 27.4 %
MCH RBC QN AUTO: 29.6 PG (ref 26.5–33)
MCHC RBC AUTO-ENTMCNC: 32.1 G/DL (ref 31.5–36.5)
MCV RBC AUTO: 92 FL (ref 78–100)
MONOCYTES # BLD AUTO: 0.6 10E9/L (ref 0–1.3)
MONOCYTES NFR BLD AUTO: 9 %
NEUTROPHILS # BLD AUTO: 4.3 10E9/L (ref 1.6–8.3)
NEUTROPHILS NFR BLD AUTO: 60.9 %
PLATELET # BLD AUTO: 210 10E9/L (ref 150–450)
PROT SERPL-MCNC: 7.7 G/DL (ref 6.8–8.8)
RBC # BLD AUTO: 4.53 10E12/L (ref 3.8–5.2)
WBC # BLD AUTO: 7.1 10E9/L (ref 4–11)

## 2019-05-29 PROCEDURE — 85025 COMPLETE CBC W/AUTO DIFF WBC: CPT | Performed by: INTERNAL MEDICINE

## 2019-05-29 PROCEDURE — 82565 ASSAY OF CREATININE: CPT | Performed by: INTERNAL MEDICINE

## 2019-05-29 PROCEDURE — 36415 COLL VENOUS BLD VENIPUNCTURE: CPT | Performed by: INTERNAL MEDICINE

## 2019-05-29 PROCEDURE — 86140 C-REACTIVE PROTEIN: CPT | Performed by: INTERNAL MEDICINE

## 2019-05-29 PROCEDURE — 85652 RBC SED RATE AUTOMATED: CPT | Performed by: INTERNAL MEDICINE

## 2019-05-29 PROCEDURE — 80076 HEPATIC FUNCTION PANEL: CPT | Performed by: INTERNAL MEDICINE

## 2019-06-03 ENCOUNTER — OFFICE VISIT (OUTPATIENT)
Dept: RHEUMATOLOGY | Facility: CLINIC | Age: 74
End: 2019-06-03
Payer: COMMERCIAL

## 2019-06-03 VITALS
WEIGHT: 150 LBS | HEART RATE: 89 BPM | HEIGHT: 65 IN | BODY MASS INDEX: 24.99 KG/M2 | DIASTOLIC BLOOD PRESSURE: 73 MMHG | OXYGEN SATURATION: 97 % | SYSTOLIC BLOOD PRESSURE: 148 MMHG

## 2019-06-03 DIAGNOSIS — M06.09 RHEUMATOID ARTHRITIS OF MULTIPLE SITES WITH NEGATIVE RHEUMATOID FACTOR (H): ICD-10-CM

## 2019-06-03 PROCEDURE — 99213 OFFICE O/P EST LOW 20 MIN: CPT | Performed by: INTERNAL MEDICINE

## 2019-06-03 RX ORDER — HYDROXYCHLOROQUINE SULFATE 200 MG/1
TABLET, FILM COATED ORAL
Qty: 135 TABLET | Refills: 2 | Status: SHIPPED | OUTPATIENT
Start: 2019-06-03 | End: 2019-06-03

## 2019-06-03 RX ORDER — HYDROXYCHLOROQUINE SULFATE 200 MG/1
200 TABLET, FILM COATED ORAL DAILY
Qty: 90 TABLET | Refills: 2 | Status: SHIPPED | OUTPATIENT
Start: 2019-06-03 | End: 2019-12-02

## 2019-06-03 ASSESSMENT — MIFFLIN-ST. JEOR: SCORE: 1183.34

## 2019-06-03 NOTE — PROGRESS NOTES
Rheumatology Clinic Visit      Marga Clark MRN# [id number]   YOB: 1945 Age: 73 year old      Date of visit: 6/03/19   PCP: Dr. Tiarra Mak    Chief Complaint   Patient presents with:  Arthritis: RA, patient has some pain but does not like to complain    Assessment and Plan     1. Rheumatoid arthritis (RF negative [[facility]], CCP low positive): S[place] 2011.  Has been followed by Prince Ritter ([facility]) and [facility] ([facility]).  Established with me on 10/30/2017. Previously on MTX (effective), prednisone (effective).  MTX and prednisone were stopped when she was doing well; inflammatory markers elevated so HCQ was started (per record review and patient interview).  Previously on HCQ 200mg BID and was doing well; reduced dose to 200mg daily and has been doing well. No synovitis on exam.   - Continue hydroxychloroquine 200mg daily (last eye exam was on 6/25/2018; asked that this be updated this summer)  - Labs in 6 months: CBC, Creatinine, Hepatic Panel, ESR, CRP     2. Left lateral epicondylitis, history: Has been physical therapy twice in the past and symptoms resolve as long as she continues the exercises.      3. Elevated blood pressure:  Marga to follow up with Primary Care provider regarding elevated blood pressure.     Ms. Clark verbalized agreement with and understanding of the rational for the diagnosis and treatment plan.  All questions were answered to best of my ability and the patient's satisfaction. Ms. Clark was advised to contact the clinic with any questions that may arise after the clinic visit.      Thank you for involving me in the care of the patient    Return to clinic: 6 months      HPI   Marga Clark is a 73 year old female with a past medical history significant for inflammatory arthritis who presents for follow-up of inflammatory arthritis.    Today, Ms. Clark reports that she is is doing okay.  She made the comment that she does not like to complain so I told her  that she should complain about her joints today because it is helpful to know what is bothering her.  She says that there is not much actually bothering her.  Tolerating hydroxychloroquine well.  Morning stiffness for no more than 5 seconds if it is present at all.  No joint swelling.  Happy with how well she is doing.     Denies fevers, chills, nausea, vomiting, constipation, diarrhea. No abdominal pain. No chest pain/pressure, palpitations, or shortness of breath. No LE swelling. No neck pain. No oral or nasal sores.  No rash. No sicca symptoms.     Tobacco: 0.5 ppd  EtOH: Occasional  Drugs: None    ROS   GEN: No fevers, chills, or night sweats  SKIN: No itching, rashes, sores  HEENT: No oral or nasal ulcers.  CV: No chest pain, pressure, palpitations, or dyspnea on exertion.  PULM: No SOB, wheeze, cough.  GI: No nausea, vomiting, constipation, diarrhea. No blood in stool. No abdominal pain.  : No blood in urine.  MSK: See HPI.  NEURO: No numbness, tingling, or weakness.  EXT: No LE swelling  PSYCH: Negative    Active Problem List     Patient Active Problem List   Diagnosis     CARDIOVASCULAR SCREENING; LDL GOAL LESS THAN 130     Advanced directives, counseling/discussion     High risk medications (not anticoagulants) long-term use     Inflammatory arthritis     Steroid long-term use     Rheumatoid arthritis of multiple sites with negative rheumatoid factor (H)     Past Medical History     Past Medical History:   Diagnosis Date     Inflammatory arthritis 10/31/2011     Past Surgical History   History reviewed. No pertinent surgical history.  Allergy   No Known Allergies  Current Medication List     Current Outpatient Medications   Medication Sig     calcipotriene-betameth diprop (TACLONEX) 0.005-0.064 % external ointment Apply daily to affected area til clear     hydroxychloroquine (PLAQUENIL) 200 MG tablet Hydroxychloroquine 200mg daily, with an additional 200mg every other day.     latanoprost (XALATAN) 0.005  "% ophthalmic solution Apply 1 drop to eye At Bedtime     triamcinolone (ARISTOCORT HP) 0.5 % external cream Apply topically 2 times daily     triamcinolone (KENALOG) 0.5 % cream Apply to rash on feet bid til clear     No current facility-administered medications for this visit.          Social History   See HPI    Family History     Family History   Problem Relation Age of Onset     Cerebrovascular Disease Mother      Glaucoma Mother      Heart Disease Father      Diabetes Maternal Grandfather      Diabetes Paternal Grandfather      Macular Degeneration No family hx of      Physical Exam     Temp Readings from Last 3 Encounters:   05/15/19 98.8  F (37.1  C) (Oral)   12/03/18 97.8  F (36.6  C) (Oral)   10/30/17 97.5  F (36.4  C) (Oral)     BP Readings from Last 5 Encounters:   06/03/19 148/73   05/15/19 137/67   12/03/18 148/79   01/29/18 137/75   10/30/17 154/82     Pulse Readings from Last 1 Encounters:   06/03/19 89     Resp Readings from Last 1 Encounters:   10/22/12 10     Estimated body mass index is 25.35 kg/m  as calculated from the following:    Height as of this encounter: 1.638 m (5' 4.5\").    Weight as of this encounter: 68 kg (150 lb).    GEN: NAD  HEENT: MMM. Anicteric, noninjected sclera  CV: S1, S2. RRR. No m/r/g.  PULM: CTA bilaterally. No w/c.  MSK: MCPs, PIPs, wrists, elbows, shoulders, knees, and ankles without swelling or tenderness to palpation.  Negative MCP and MTP squeeze.        NEURO: UE and LE strengths 5/5 and equal bilaterally.   SKIN: No rash  EXT: No LE edema  PSYCH: Alert. Appropriate.    Labs / Imaging (select studies)   RF/CCP  Recent Labs   Lab Test 10/17/11  1541   CCPABY 33*     CBC  Recent Labs   Lab Test 05/29/19  0853 01/22/18  0827 10/30/13  1003   WBC 7.1 6.3 12.5*   RBC 4.53 4.70 4.41   HGB 13.4 14.1 13.4   HCT 41.8 43.3 41.1   MCV 92 92 93   RDW 13.4 13.3 13.5    199 247   MCH 29.6 30.0 30.4   MCHC 32.1 32.6 32.6   NEUTROPHIL 60.9 60.5 80.5   LYMPH 27.4 25.6 12.2 "   MONOCYTE 9.0 10.7 5.5   EOSINOPHIL 2.1 2.6 1.2   BASOPHIL 0.6 0.6 0.6   ANEU 4.3 3.8 10.0*   ALYM 1.9 1.6 1.5   MORENITA 0.6 0.7 0.7   AEOS 0.2 0.2 0.2   ABAS 0.0 0.0 0.1     CMP  Recent Labs   Lab Test 05/29/19  0853 01/22/18  0827 07/14/17  0806 10/30/13  1003  10/17/11  1541   NA  --   --   --  140  --  140   POTASSIUM  --   --   --  3.9  --  4.1   CHLORIDE  --   --   --  104  --  105   CO2  --   --   --  27  --  24   ANIONGAP  --   --   --  9  --  11   GLC  --   --  80 83  --  88   BUN  --   --   --  12  --  15   CR 0.67 0.68  --  0.62   < > 0.59   GFRESTIMATED 87 85  --  >90   < > >90   GFRESTBLACK >90 >90  --  >90   < > >90   JOANNE  --   --   --  9.1  --  9.8   BILITOTAL 0.7 0.8  --  0.8   < > 0.4   ALBUMIN 4.0 4.0  --  3.9   < > 4.1   PROTTOTAL 7.7 7.8  --  7.4   < > 8.3   ALKPHOS 92 91  --  88   < > 131   AST 39 38  --  43   < > 38   ALT 23 24  --  29   < > 14    < > = values in this interval not displayed.     Calcium/VitaminD  Recent Labs   Lab Test 10/30/13  1003 10/17/11  1541   JOANNE 9.1 9.8     ESR/CRP  Recent Labs   Lab Test 05/29/19  0853 01/22/18  0827 07/09/13  0940  10/17/11  1541   SED 20 18  --   --  65*   CRP <2.9 4.0 7.6   < >  --     < > = values in this interval not displayed.     Hepatitis B  Recent Labs   Lab Test 01/22/18 0827   HBCAB Nonreactive   HEPBANG Nonreactive     Hepatitis C  Recent Labs   Lab Test 10/31/11  0913   HCVAB Negative     Tuberculosis Screening  Recent Labs   Lab Test 10/31/11  0915   TBRSLT Negative   TBAGN 0.00     Immunization History     Immunization History   Administered Date(s) Administered     Pneumo Conj 13-V (2010&after) 10/30/2017     Pneumococcal 23 valent 10/11/2011     TDAP Vaccine (Adacel) 01/10/2007, 07/05/2017     Zoster vaccine, live 07/28/2009          Chart documentation done in part with Dragon Voice recognition Software. Although reviewed after completion, some word and grammatical error may remain.    Kody Urias MD

## 2019-06-03 NOTE — NURSING NOTE
Marga to follow up with Primary Care provider regarding elevated blood pressure.           RAPID3 (0-30) Cumulative Score  2.7          RAPID3 Weighted Score (divide #4 by 3 and that is the weighted score)  0.9       Naomie Singh CMA Rheumatology  6/3/2019 9:08 AM

## 2019-06-03 NOTE — PATIENT INSTRUCTIONS
Rheumatology    Dr. Kody Urias         [facility]   (Monday)  [address]  [place] [address]       [facility]   (Tuesday)  [address]  [place] [address]    [facility]   (Wed., Thurs., and Friday)  [address] [place] [address]    Phone number: [phone]  Thank you for choosing [facility].  Naomie Singh CMA

## 2019-07-09 ENCOUNTER — OFFICE VISIT (OUTPATIENT)
Dept: OPHTHALMOLOGY | Facility: CLINIC | Age: 74
End: 2019-07-09
Payer: COMMERCIAL

## 2019-07-09 DIAGNOSIS — H40.1121 PRIMARY OPEN ANGLE GLAUCOMA OF LEFT EYE, MILD STAGE: ICD-10-CM

## 2019-07-09 DIAGNOSIS — Z79.899 HIGH RISK MEDICATIONS (NOT ANTICOAGULANTS) LONG-TERM USE: Primary | ICD-10-CM

## 2019-07-09 DIAGNOSIS — M19.90 INFLAMMATORY ARTHRITIS: ICD-10-CM

## 2019-07-09 PROCEDURE — 92012 INTRM OPH EXAM EST PATIENT: CPT | Performed by: STUDENT IN AN ORGANIZED HEALTH CARE EDUCATION/TRAINING PROGRAM

## 2019-07-09 PROCEDURE — 92082 INTERMEDIATE VISUAL FIELD XM: CPT | Performed by: STUDENT IN AN ORGANIZED HEALTH CARE EDUCATION/TRAINING PROGRAM

## 2019-07-09 PROCEDURE — 92134 CPTRZ OPH DX IMG PST SGM RTA: CPT | Performed by: STUDENT IN AN ORGANIZED HEALTH CARE EDUCATION/TRAINING PROGRAM

## 2019-07-09 ASSESSMENT — SLIT LAMP EXAM - LIDS: COMMENTS: NORMAL

## 2019-07-09 ASSESSMENT — TONOMETRY
OS_IOP_MMHG: 16
IOP_METHOD: APPLANATION
OD_IOP_MMHG: 16

## 2019-07-09 ASSESSMENT — EXTERNAL EXAM - RIGHT EYE: OD_EXAM: NORMAL

## 2019-07-09 ASSESSMENT — CUP TO DISC RATIO
OD_RATIO: 0.5 UD
OS_RATIO: 0.85

## 2019-07-09 ASSESSMENT — VISUAL ACUITY
OS_CC: 20/25+3
CORRECTION_TYPE: GLASSES
OD_CC: 20/25-1
METHOD: SNELLEN - LINEAR

## 2019-07-09 ASSESSMENT — EXTERNAL EXAM - LEFT EYE: OS_EXAM: NORMAL

## 2019-07-09 NOTE — PROGRESS NOTES
Current Eye Medications: latanoprost nightly ,drops at 9:30pm     Subjective:  Plaquenil recheck  Patient reports is seeing well, vision seems unchanged and states eyes seem otherwise fine.      Objective:  See Ophthalmology Exam.      Assessment:  Marga Clark is a 73 year old female who presents with:     High risk medications (not anticoagulants) long-term use Macular SD-OCT within normal limits both eyes.    Gaitan visual field (HVF) 10-2 within normal limits both eyes (mild non-specific scattered loss in both eyes).     No signs of Plaquenil retinal toxicity at present.        Inflammatory arthritis      Primary open angle glaucoma of left eye, mild stage Intraocular pressure 16/16 today.        Plan:  Continue Latanoprost (teal top) at bedtime both eyes      Return in 6 months for glaucoma check (pressure check, OCT optic nerve)    Nora Oviedo MD  [phone]

## 2019-07-09 NOTE — PATIENT INSTRUCTIONS
Continue Latanoprost (teal top) at bedtime both eyes      Return in 6 months for glaucoma check (pressure check, OCT optic nerve)    Nora Oviedo MD  [phone]

## 2019-07-09 NOTE — LETTER
7/9/2019         RE: Marga Clark  [address] [address]        Dear Colleague,    Thank you for referring your patient, Marga Clark, to the [facility]. Please see a copy of my visit note below.     Current Eye Medications: latanoprost nightly ,drops at 9:30pm     Subjective:  Plaquenil recheck  Patient reports is seeing well, vision seems unchanged and states eyes seem otherwise fine.      Objective:  See Ophthalmology Exam.      Assessment:  Marga Clark is a 73 year old female who presents with:     High risk medications (not anticoagulants) long-term use Macular SD-OCT within normal limits both eyes.    Gaitan visual field (HVF) 10-2 within normal limits both eyes (mild non-specific scattered loss in both eyes).     No signs of Plaquenil retinal toxicity at present.        Inflammatory arthritis      Primary open angle glaucoma of left eye, mild stage Intraocular pressure 16/16 today.        Plan:  Continue Latanoprost (teal top) at bedtime both eyes      Return in 6 months for glaucoma check (pressure check, OCT optic nerve)    Nora Oviedo MD  [phone]               Again, thank you for allowing me to participate in the care of your patient.        Sincerely,        Nora Oviedo MD

## 2019-10-01 ENCOUNTER — ANCILLARY PROCEDURE (OUTPATIENT)
Dept: MAMMOGRAPHY | Facility: CLINIC | Age: 74
End: 2019-10-01
Payer: COMMERCIAL

## 2019-10-01 DIAGNOSIS — Z12.31 VISIT FOR SCREENING MAMMOGRAM: ICD-10-CM

## 2019-10-01 PROCEDURE — 77067 SCR MAMMO BI INCL CAD: CPT | Mod: TC

## 2019-10-14 ENCOUNTER — OFFICE VISIT (OUTPATIENT)
Dept: FAMILY MEDICINE | Facility: CLINIC | Age: 74
End: 2019-10-14
Payer: COMMERCIAL

## 2019-10-14 VITALS
BODY MASS INDEX: 24.84 KG/M2 | TEMPERATURE: 98.6 F | DIASTOLIC BLOOD PRESSURE: 64 MMHG | SYSTOLIC BLOOD PRESSURE: 132 MMHG | HEART RATE: 94 BPM | WEIGHT: 147 LBS

## 2019-10-14 DIAGNOSIS — D12.6 ADENOMATOUS POLYP OF COLON, UNSPECIFIED PART OF COLON: ICD-10-CM

## 2019-10-14 DIAGNOSIS — J01.00 ACUTE NON-RECURRENT MAXILLARY SINUSITIS: Primary | ICD-10-CM

## 2019-10-14 DIAGNOSIS — Z72.0 TOBACCO ABUSE: ICD-10-CM

## 2019-10-14 DIAGNOSIS — Z79.899 HIGH RISK MEDICATIONS (NOT ANTICOAGULANTS) LONG-TERM USE: ICD-10-CM

## 2019-10-14 DIAGNOSIS — Z12.11 SCREEN FOR COLON CANCER: ICD-10-CM

## 2019-10-14 PROCEDURE — 99214 OFFICE O/P EST MOD 30 MIN: CPT | Performed by: FAMILY MEDICINE

## 2019-10-14 RX ORDER — AZITHROMYCIN 250 MG/1
TABLET, FILM COATED ORAL
Qty: 6 TABLET | Refills: 0 | Status: SHIPPED | OUTPATIENT
Start: 2019-10-14 | End: 2019-10-19

## 2019-10-14 NOTE — PROGRESS NOTES
Subjective     Marga Clark is a 74 year old female who presents to clinic today for the following health issues:    HPI   ENT Symptoms             Symptoms: cc Present Absent Comment   Fever/Chills  x     Fatigue  x     Muscle Aches  x     Eye Irritation  x     Sneezing   x    Nasal Fito/Drg  x     Sinus Pressure/Pain  x     Loss of smell   x    Dental pain  x     Sore Throat  x     Swollen Glands  x     Ear Pain/Fullness   x    Cough  x     Wheeze   x    Chest Pain  x     Shortness of breath  x     Rash   x    Other         Symptom duration:  sept 21   Symptom severity:     Treatments tried:  cough syrup dm, aspirin    Contacts:  family     Pt with symptoms above  [facility] for flonase, amoxicillin and benzoate for her sxs  She stated they make her stomach burn so she did not finish them  Having pain in cheeks and in teeth  Persistent cough for nearly a month    Pt with RA so immunosuppressed on plaquenil  She is also a smoker          Reviewed and updated as needed this visit by Provider         Review of Systems   ROS COMP: Constitutional, HEENT, cardiovascular, pulmonary, gi and gu systems are negative, except as otherwise noted.      Objective    /64   Pulse 94   Temp 98.6  F (37  C) (Oral)   Wt 66.7 kg (147 lb)   BMI 24.84 kg/m    Body mass index is 24.84 kg/m .  Physical Exam   GENERAL: healthy, alert and no distress  EYES: Eyes grossly normal to inspection, PERRL and conjunctivae and sclerae normal  HENT: normal cephalic/atraumatic, ear canals and TM's normal, nose and mouth without ulcers or lesions, oropharynx clear, oral mucous membranes moist and sinuses: maxillary tenderness on bilateral  NECK: no adenopathy, no asymmetry, masses, or scars and thyroid normal to palpation  RESP: lungs clear to auscultation - no rales, rhonchi or wheezes  CV: regular rate and rhythm, normal S1 S2, no S3 or S4, no murmur, click or rub, no peripheral edema and peripheral pulses  "strong    Diagnostic Test Results:  Labs reviewed in Epic        Assessment & Plan     1. Acute non-recurrent maxillary sinusitis  Trial of zpak hopefully she tolerates this better  - azithromycin (ZITHROMAX) 250 MG tablet; Take 2 tablets (500 mg) by mouth daily for 1 day, THEN 1 tablet (250 mg) daily for 4 days.  Dispense: 6 tablet; Refill: 0    2. High risk medications (not anticoagulants) long-term use  On plaquenil for RA, immunosuppressant    3. Screen for colon cancer  Pt agreeable to colonoscopy. Had adenomatous polyp in the past  - GASTROENTEROLOGY ADULT REF PROCEDURE ONLY [facility] [phone]; No Provider Preference    4. Tobacco abuse  Encourage cessation       Tobacco Cessation:   reports that she has been smoking cigarettes. She has a 20.00 pack-year smoking history. She has never used smokeless tobacco.  Tobacco Cessation Action Plan: Information offered: Patient not interested at this time      BMI:   Estimated body mass index is 24.84 kg/m  as calculated from the following:    Height as of 6/3/19: 1.638 m (5' 4.5\").    Weight as of this encounter: 66.7 kg (147 lb).               No follow-ups on file.    Tiarra Castillo MD  [facility]    "

## 2019-11-25 DIAGNOSIS — H40.1121 PRIMARY OPEN ANGLE GLAUCOMA OF LEFT EYE, MILD STAGE: ICD-10-CM

## 2019-11-25 DIAGNOSIS — M06.09 RHEUMATOID ARTHRITIS OF MULTIPLE SITES WITH NEGATIVE RHEUMATOID FACTOR (H): ICD-10-CM

## 2019-11-25 LAB
ALBUMIN SERPL-MCNC: 4 G/DL (ref 3.4–5)
ALP SERPL-CCNC: 99 U/L (ref 40–150)
ALT SERPL W P-5'-P-CCNC: 21 U/L (ref 0–50)
AST SERPL W P-5'-P-CCNC: 43 U/L (ref 0–45)
BASOPHILS # BLD AUTO: 0 10E9/L (ref 0–0.2)
BASOPHILS NFR BLD AUTO: 0.4 %
BILIRUB DIRECT SERPL-MCNC: 0.2 MG/DL (ref 0–0.2)
BILIRUB SERPL-MCNC: 0.8 MG/DL (ref 0.2–1.3)
CREAT SERPL-MCNC: 0.69 MG/DL (ref 0.52–1.04)
CRP SERPL-MCNC: 6.2 MG/L (ref 0–8)
DIFFERENTIAL METHOD BLD: NORMAL
EOSINOPHIL # BLD AUTO: 0.1 10E9/L (ref 0–0.7)
EOSINOPHIL NFR BLD AUTO: 2 %
ERYTHROCYTE [DISTWIDTH] IN BLOOD BY AUTOMATED COUNT: 13.4 % (ref 10–15)
ERYTHROCYTE [SEDIMENTATION RATE] IN BLOOD BY WESTERGREN METHOD: 21 MM/H (ref 0–30)
GFR SERPL CREATININE-BSD FRML MDRD: 86 ML/MIN/{1.73_M2}
HCT VFR BLD AUTO: 42.3 % (ref 35–47)
HGB BLD-MCNC: 13.8 G/DL (ref 11.7–15.7)
LYMPHOCYTES # BLD AUTO: 1.8 10E9/L (ref 0.8–5.3)
LYMPHOCYTES NFR BLD AUTO: 25.4 %
MCH RBC QN AUTO: 30 PG (ref 26.5–33)
MCHC RBC AUTO-ENTMCNC: 32.6 G/DL (ref 31.5–36.5)
MCV RBC AUTO: 92 FL (ref 78–100)
MONOCYTES # BLD AUTO: 0.7 10E9/L (ref 0–1.3)
MONOCYTES NFR BLD AUTO: 9.4 %
NEUTROPHILS # BLD AUTO: 4.4 10E9/L (ref 1.6–8.3)
NEUTROPHILS NFR BLD AUTO: 62.8 %
PLATELET # BLD AUTO: 193 10E9/L (ref 150–450)
PROT SERPL-MCNC: 7.5 G/DL (ref 6.8–8.8)
RBC # BLD AUTO: 4.6 10E12/L (ref 3.8–5.2)
WBC # BLD AUTO: 6.9 10E9/L (ref 4–11)

## 2019-11-25 PROCEDURE — 85025 COMPLETE CBC W/AUTO DIFF WBC: CPT | Performed by: INTERNAL MEDICINE

## 2019-11-25 PROCEDURE — 36415 COLL VENOUS BLD VENIPUNCTURE: CPT | Performed by: INTERNAL MEDICINE

## 2019-11-25 PROCEDURE — 80076 HEPATIC FUNCTION PANEL: CPT | Performed by: INTERNAL MEDICINE

## 2019-11-25 PROCEDURE — 85652 RBC SED RATE AUTOMATED: CPT | Performed by: INTERNAL MEDICINE

## 2019-11-25 PROCEDURE — 86140 C-REACTIVE PROTEIN: CPT | Performed by: INTERNAL MEDICINE

## 2019-11-25 PROCEDURE — 82565 ASSAY OF CREATININE: CPT | Performed by: INTERNAL MEDICINE

## 2019-11-25 RX ORDER — LATANOPROST 50 UG/ML
1 SOLUTION/ DROPS OPHTHALMIC AT BEDTIME
Qty: 1 BOTTLE | Refills: 12 | Status: SHIPPED | OUTPATIENT
Start: 2019-11-25 | End: 2020-01-09

## 2019-11-25 NOTE — TELEPHONE ENCOUNTER
Refilled Latanoprost for patient to [facility]. Will send to Dr. Oviedo to sign and send.

## 2019-11-25 NOTE — LETTER
[facility]  [address] [address]    November 26, 2019    Marga Clark  [address] [address]      Dear Marga,    Your labs are normal.     Enclosed is a copy of your results.   Results for orders placed or performed in visit on 11/25/19   CBC with platelets differential     Status: None   Result Value Ref Range    WBC 6.9 4.0 - 11.0 10e9/L    RBC Count 4.60 3.8 - 5.2 10e12/L    Hemoglobin 13.8 11.7 - 15.7 g/dL    Hematocrit 42.3 35.0 - 47.0 %    MCV 92 78 - 100 fl    MCH 30.0 26.5 - 33.0 pg    MCHC 32.6 31.5 - 36.5 g/dL    RDW 13.4 10.0 - 15.0 %    Platelet Count 193 150 - 450 10e9/L    % Neutrophils 62.8 %    % Lymphocytes 25.4 %    % Monocytes 9.4 %    % Eosinophils 2.0 %    % Basophils 0.4 %    Absolute Neutrophil 4.4 1.6 - 8.3 10e9/L    Absolute Lymphocytes 1.8 0.8 - 5.3 10e9/L    Absolute Monocytes 0.7 0.0 - 1.3 10e9/L    Absolute Eosinophils 0.1 0.0 - 0.7 10e9/L    Absolute Basophils 0.0 0.0 - 0.2 10e9/L    Diff Method Automated Method    Creatinine     Status: None   Result Value Ref Range    Creatinine 0.69 0.52 - 1.04 mg/dL    GFR Estimate 86 >60 mL/min/[1.73_m2]    GFR Estimate If Black >90 >60 mL/min/[1.73_m2]   Erythrocyte sedimentation rate auto     Status: None   Result Value Ref Range    Sed Rate 21 0 - 30 mm/h   CRP inflammation     Status: None   Result Value Ref Range    CRP Inflammation 6.2 0.0 - 8.0 mg/L   Hepatic panel     Status: None   Result Value Ref Range    Bilirubin Direct 0.2 0.0 - 0.2 mg/dL    Bilirubin Total 0.8 0.2 - 1.3 mg/dL    Albumin 4.0 3.4 - 5.0 g/dL    Protein Total 7.5 6.8 - 8.8 g/dL    Alkaline Phosphatase 99 40 - 150 U/L    ALT 21 0 - 50 U/L    AST 43 0 - 45 U/L       If you have any questions or concerns, please call myself or my nurse at [phone].    Sincerely,    Kody Urias MD/toña

## 2019-11-25 NOTE — TELEPHONE ENCOUNTER
Fax received from pharmacy requesting refill of  latanoprost (XALATAN) 0.005 % ophthalmic solution.

## 2019-11-26 NOTE — RESULT ENCOUNTER NOTE
"Please mail Ms. Clark her results with the following message.    \"Ms. Clark,    Your labs are normal.    Please let me know if you have any questions.    Sincerely,  Kody Urias MD  11/26/2019 2:20 PM\"  "

## 2019-12-02 ENCOUNTER — OFFICE VISIT (OUTPATIENT)
Dept: RHEUMATOLOGY | Facility: CLINIC | Age: 74
End: 2019-12-02
Payer: COMMERCIAL

## 2019-12-02 VITALS
DIASTOLIC BLOOD PRESSURE: 64 MMHG | SYSTOLIC BLOOD PRESSURE: 134 MMHG | WEIGHT: 148.8 LBS | OXYGEN SATURATION: 98 % | BODY MASS INDEX: 24.79 KG/M2 | HEIGHT: 65 IN | HEART RATE: 86 BPM

## 2019-12-02 DIAGNOSIS — M06.09 RHEUMATOID ARTHRITIS OF MULTIPLE SITES WITH NEGATIVE RHEUMATOID FACTOR (H): Primary | ICD-10-CM

## 2019-12-02 DIAGNOSIS — Z79.899 HIGH RISK MEDICATIONS (NOT ANTICOAGULANTS) LONG-TERM USE: ICD-10-CM

## 2019-12-02 PROCEDURE — 99213 OFFICE O/P EST LOW 20 MIN: CPT | Performed by: INTERNAL MEDICINE

## 2019-12-02 RX ORDER — HYDROXYCHLOROQUINE SULFATE 200 MG/1
200 TABLET, FILM COATED ORAL DAILY
Qty: 90 TABLET | Refills: 2 | Status: SHIPPED | OUTPATIENT
Start: 2019-12-02 | End: 2020-05-11

## 2019-12-02 ASSESSMENT — MIFFLIN-ST. JEOR: SCORE: 1172.89

## 2019-12-02 NOTE — PROGRESS NOTES
Rheumatology Clinic Visit      Marga Clark MRN# [id number]   YOB: 1945 Age: 74 year old      Date of visit: 12/02/19   PCP: Dr. Tiarra Mak    Chief Complaint   Patient presents with:  RECHECK: RA    Assessment and Plan     1. Rheumatoid arthritis (RF negative [[facility]], CCP low positive): Sera'd 2011.  Has been followed by Prnice Ritter ([place]) and Frances ([facility]).  Established with me on 10/30/2017. Previously on MTX (effective), prednisone (effective).  MTX and prednisone were stopped when she was doing well; inflammatory markers elevated so HCQ was started (per record review and patient interview).  Previously on HCQ 200mg BID and was doing well; reduced dose to 200mg daily and has been doing well. No synovitis on exam.  Continue hydroxychloroquine 200 mg daily  - Continue hydroxychloroquine 200mg daily (last eye exam was on 7/9/2019 for visual fields with OCT planned to be in January 2020)  - Labs in 6 months: CBC, Creatinine, Hepatic Panel, ESR, CRP     2. R>L trochanteric bursitis: improved in the past with regular stretching exercises; she plans to restart these      Ms. Clark verbalized agreement with and understanding of the rational for the diagnosis and treatment plan.  All questions were answered to best of my ability and the patient's satisfaction. Ms. Clark was advised to contact the clinic with any questions that may arise after the clinic visit.     Thank you for involving me in the care of the patient    Return to clinic: 6 months      HPI   Marga Clark is a 74 year old female with a past medical history significant for inflammatory arthritis who presents for follow-up of inflammatory arthritis.    Today, Ms. Clark reports that she is is doing well.  Occasional joint ache that is brief and resolve spontaneously.  Tolerating hydroxychloroquine well.  Had her eye exam in July for Plaquenil toxicity monitoring and says that she is going back in January for another  part of the exam.  Morning stiffness generally not present, and if so than it is for less than 1 minute.  No joint swelling.  Occasional bilateral hip pain that does not radiate; she was doing stretching exercises that resolved this but she has not been doing those for a while because she was doing well; she says that she will restart the exercises.    Denies fevers, chills, nausea, vomiting, constipation, diarrhea. No abdominal pain. No chest pain/pressure, palpitations, or shortness of breath. No LE swelling. No neck pain. No oral or nasal sores.  No rash. No sicca symptoms.     Tobacco: 0.5 ppd  EtOH: Occasional  Drugs: None    ROS   GEN: No fevers, chills, or night sweats  SKIN: No itching, rashes, sores  HEENT: No oral or nasal ulcers.  CV: No chest pain, pressure, palpitations, or dyspnea on exertion.  PULM: No SOB, wheeze, cough.  GI: No nausea, vomiting, constipation, diarrhea. No blood in stool. No abdominal pain.  : No blood in urine.  MSK: See HPI.  NEURO: No numbness, tingling, or weakness.  EXT: No LE swelling  PSYCH: Negative    Active Problem List     Patient Active Problem List   Diagnosis     CARDIOVASCULAR SCREENING; LDL GOAL LESS THAN 130     Advanced directives, counseling/discussion     High risk medications (not anticoagulants) long-term use     Inflammatory arthritis     Steroid long-term use     Rheumatoid arthritis of multiple sites with negative rheumatoid factor (H)     Adenomatous polyp of colon, unspecified part of colon     Tobacco abuse     Past Medical History     Past Medical History:   Diagnosis Date     Inflammatory arthritis 10/31/2011     Past Surgical History   No past surgical history on file.  Allergy   No Known Allergies  Current Medication List     Current Outpatient Medications   Medication Sig     calcipotriene-betameth diprop (TACLONEX) 0.005-0.064 % external ointment Apply daily to affected area til clear     hydroxychloroquine (PLAQUENIL) 200 MG tablet Take 1 tablet  "(200 mg) by mouth daily     latanoprost (XALATAN) 0.005 % ophthalmic solution Place 1 drop into both eyes At Bedtime     triamcinolone (ARISTOCORT HP) 0.5 % external cream Apply topically 2 times daily     triamcinolone (KENALOG) 0.5 % cream Apply to rash on feet bid til clear     No current facility-administered medications for this visit.          Social History   See HPI    Family History     Family History   Problem Relation Age of Onset     Cerebrovascular Disease Mother      Glaucoma Mother      Heart Disease Father      Diabetes Maternal Grandfather      Diabetes Paternal Grandfather      Macular Degeneration No family hx of      Physical Exam     Temp Readings from Last 3 Encounters:   10/14/19 98.6  F (37  C) (Oral)   05/15/19 98.8  F (37.1  C) (Oral)   12/03/18 97.8  F (36.6  C) (Oral)     BP Readings from Last 5 Encounters:   12/02/19 134/64   10/14/19 132/64   06/03/19 148/73   05/15/19 137/67   12/03/18 148/79     Pulse Readings from Last 1 Encounters:   12/02/19 86     Resp Readings from Last 1 Encounters:   10/22/12 10     Estimated body mass index is 25.15 kg/m  as calculated from the following:    Height as of this encounter: 1.638 m (5' 4.5\").    Weight as of this encounter: 67.5 kg (148 lb 12.8 oz).    GEN: NAD  HEENT: MMM. Anicteric, noninjected sclera  CV: S1, S2. RRR. No m/r/g.  PULM: CTA bilaterally. No w/c.  MSK: MCPs, PIPs, DIPs, wrists, elbows, shoulders, knees, and ankles without swelling or tenderness to palpation.  Negative MCP and MTP squeeze.   Both hips tender to palpation and with range of motion; worse on the right.  NEURO: UE and LE strengths 5/5 and equal bilaterally.   SKIN: No rash  EXT: No LE edema  PSYCH: Alert. Appropriate.    Labs / Imaging (select studies)   RF/CCP  Recent Labs   Lab Test 10/17/11  1541   CCPABY 33*     CBC  Recent Labs   Lab Test 11/25/19  0924 05/29/19  0853 01/22/18  0827   WBC 6.9 7.1 6.3   RBC 4.60 4.53 4.70   HGB 13.8 13.4 14.1   HCT 42.3 41.8 43.3 "   MCV 92 92 92   RDW 13.4 13.4 13.3    210 199   MCH 30.0 29.6 30.0   MCHC 32.6 32.1 32.6   NEUTROPHIL 62.8 60.9 60.5   LYMPH 25.4 27.4 25.6   MONOCYTE 9.4 9.0 10.7   EOSINOPHIL 2.0 2.1 2.6   BASOPHIL 0.4 0.6 0.6   ANEU 4.4 4.3 3.8   ALYM 1.8 1.9 1.6   MORENITA 0.7 0.6 0.7   AEOS 0.1 0.2 0.2   ABAS 0.0 0.0 0.0     CMP  Recent Labs   Lab Test 11/25/19 0924 05/29/19  0853 01/22/18  0827 07/14/17  0806 10/30/13  1003  10/17/11  1541   NA  --   --   --   --  140  --  140   POTASSIUM  --   --   --   --  3.9  --  4.1   CHLORIDE  --   --   --   --  104  --  105   CO2  --   --   --   --  27  --  24   ANIONGAP  --   --   --   --  9  --  11   GLC  --   --   --  80 83  --  88   BUN  --   --   --   --  12  --  15   CR 0.69 0.67 0.68  --  0.62   < > 0.59   GFRESTIMATED 86 87 85  --  >90   < > >90   GFRESTBLACK >90 >90 >90  --  >90   < > >90   JOANNE  --   --   --   --  9.1  --  9.8   BILITOTAL 0.8 0.7 0.8  --  0.8   < > 0.4   ALBUMIN 4.0 4.0 4.0  --  3.9   < > 4.1   PROTTOTAL 7.5 7.7 7.8  --  7.4   < > 8.3   ALKPHOS 99 92 91  --  88   < > 131   AST 43 39 38  --  43   < > 38   ALT 21 23 24  --  29   < > 14    < > = values in this interval not displayed.     Calcium/VitaminD  Recent Labs   Lab Test 10/30/13  1003 10/17/11  1541   JOANNE 9.1 9.8     ESR/CRP  Recent Labs   Lab Test 11/25/19  0924 05/29/19  0853 01/22/18  0827   SED 21 20 18   CRP 6.2 <2.9 4.0     Hepatitis B  Recent Labs   Lab Test 01/22/18  0827   HBCAB Nonreactive   HEPBANG Nonreactive     Hepatitis C  Recent Labs   Lab Test 10/31/11  0913   HCVAB Negative       Tuberculosis Screening  Recent Labs   Lab Test 10/31/11  0915   TBRSLT Negative   TBAGN 0.00     Immunization History     Immunization History   Administered Date(s) Administered     Pneumo Conj 13-V (2010&after) 10/30/2017     Pneumococcal 23 valent 10/11/2011     TDAP Vaccine (Adacel) 01/10/2007, 07/05/2017     Zoster vaccine, live 07/28/2009          Chart documentation done in part with Dragon Voice  recognition Software. Although reviewed after completion, some word and grammatical error may remain.    Kody Urias MD

## 2019-12-02 NOTE — NURSING NOTE
RAPID3 (0-30) Cumulative Score  3.2          RAPID3 Weighted Score (divide #4 by 3 and that is the weighted score)  1.06

## 2019-12-23 NOTE — PROGRESS NOTES
Current Eye Medications:  Latanoprost both eyes every evening.  Last drops:  9:15pm.     Subjective:  Follow up Open Angle Glaucoma.  Patient is here for a pressure check.  Tolerating the drops.  No vision changes or concerns.      Objective:  See Ophthalmology Exam.      Assessment:  Marga Clark is a 72 year old female who presents with:   Encounter Diagnosis   Name Primary?     Primary open angle glaucoma of left eye, mild stage Intraocular pressure from 21/21 to 18/18 on latanoprost.      Plan:  Continue same medications  24-2 HVF and OCT macula AND optic nerve in 11 months    Nora Oviedo MD  [phone]    .       
CAD S/P percutaneous coronary angioplasty

## 2020-01-07 ENCOUNTER — OFFICE VISIT (OUTPATIENT)
Dept: OPHTHALMOLOGY | Facility: CLINIC | Age: 75
End: 2020-01-07
Payer: COMMERCIAL

## 2020-01-07 DIAGNOSIS — M19.90 INFLAMMATORY ARTHRITIS: ICD-10-CM

## 2020-01-07 DIAGNOSIS — H40.1121 PRIMARY OPEN ANGLE GLAUCOMA OF LEFT EYE, MILD STAGE: Primary | ICD-10-CM

## 2020-01-07 DIAGNOSIS — H02.9 LESION OF LEFT LOWER EYELID: ICD-10-CM

## 2020-01-07 DIAGNOSIS — Z79.899 HIGH RISK MEDICATIONS (NOT ANTICOAGULANTS) LONG-TERM USE: ICD-10-CM

## 2020-01-07 PROCEDURE — 92012 INTRM OPH EXAM EST PATIENT: CPT | Performed by: STUDENT IN AN ORGANIZED HEALTH CARE EDUCATION/TRAINING PROGRAM

## 2020-01-07 PROCEDURE — 92133 CPTRZD OPH DX IMG PST SGM ON: CPT | Performed by: STUDENT IN AN ORGANIZED HEALTH CARE EDUCATION/TRAINING PROGRAM

## 2020-01-07 ASSESSMENT — VISUAL ACUITY
OD_CC+: -1
OS_CC+: -2
CORRECTION_TYPE: GLASSES
OD_CC: 20/20
OS_CC: 20/20
METHOD: SNELLEN - LINEAR

## 2020-01-07 ASSESSMENT — TONOMETRY
OD_IOP_MMHG: 13
OS_IOP_MMHG: 16
IOP_METHOD: APPLANATION

## 2020-01-07 ASSESSMENT — EXTERNAL EXAM - RIGHT EYE: OD_EXAM: NORMAL

## 2020-01-07 ASSESSMENT — CUP TO DISC RATIO
OS_RATIO: 0.85 UD
OD_RATIO: 0.5 UD

## 2020-01-07 ASSESSMENT — EXTERNAL EXAM - LEFT EYE: OS_EXAM: NORMAL

## 2020-01-07 ASSESSMENT — SLIT LAMP EXAM - LIDS: COMMENTS: NORMAL

## 2020-01-07 NOTE — PROGRESS NOTES
Current Eye Medications:  Latanoprost every evening both eyes (8:45pm)     Subjective:  OCT//TA   No changes in eyes noticed.  Doing well.     Objective:  See Ophthalmology Exam.       Assessment:  Marga Clark is a 74 year old female who presents with:   Encounter Diagnoses   Name Primary?     Primary open angle glaucoma of left eye, mild stage OCT optic nerve: avg retinal nerve fiber layer 78/68 (previously 84/70). Started latanoprost June 2018.     Plan to repeat OCT in 6 months and consider adding Cosopt. Intraocular pressure 13/16 today.      High risk medications (not anticoagulants) long-term use      Inflammatory arthritis      Lesion of left lower eyelid        Plan:  Continue Latanoprost (teal top) at bedtime both eyes     Referral to Dr. Hernandez for left lower lid evaluation    Return in 6 months for Plaquenil tests and dilated exam    Nora Oviedo MD  [phone]

## 2020-01-07 NOTE — LETTER
1/7/2020         RE: Marga Clark  [address] [facility] [address]        Dear Colleague,    Thank you for referring your patient, Marga Clark, to the [facility]. Please see a copy of my visit note below.     Current Eye Medications:  Latanoprost every evening both eyes (8:45pm)     Subjective:  OCT//TA   No changes in eyes noticed.  Doing well.     Objective:  See Ophthalmology Exam.       Assessment:  Marga Clark is a 74 year old female who presents with:   Encounter Diagnoses   Name Primary?     Primary open angle glaucoma of left eye, mild stage OCT optic nerve: avg retinal nerve fiber layer 78/68 (previously 84/70). Started latanoprost June 2018.     Plan to repeat OCT in 6 months and consider adding Cosopt. Intraocular pressure 13/16 today.      High risk medications (not anticoagulants) long-term use      Inflammatory arthritis      Lesion of left lower eyelid        Plan:  Continue Latanoprost (teal top) at bedtime both eyes     Referral to Dr. Hernandez for left lower lid evaluation    Return in 6 months for Plaquenil tests and dilated exam    Nora Oviedo MD  [phone]        Again, thank you for allowing me to participate in the care of your patient.        Sincerely,        Nora Oviedo MD

## 2020-01-07 NOTE — PATIENT INSTRUCTIONS
Continue Latanoprost (teal top) at bedtime both eyes     Referral to Dr. Hernandez for left lower lid evaluation    Return in 6 months for Plaquenil tests and dilated exam    Nora Oviedo MD  [phone]

## 2020-01-09 DIAGNOSIS — H40.1121 PRIMARY OPEN ANGLE GLAUCOMA OF LEFT EYE, MILD STAGE: ICD-10-CM

## 2020-01-09 RX ORDER — LATANOPROST 50 UG/ML
1 SOLUTION/ DROPS OPHTHALMIC AT BEDTIME
Qty: 1 BOTTLE | Refills: 12 | Status: SHIPPED | OUTPATIENT
Start: 2020-01-09 | End: 2020-07-02

## 2020-01-09 NOTE — TELEPHONE ENCOUNTER
Pt called and stated that she saw Dr. Oviedo the other day.  Stated that she was suppose to get a refill of her Latanoprost and stated it was not at the pharmacy. Doesn't look like any medication was sent on the day of her visit. All in the past. Please send rx to [facility].

## 2020-01-09 NOTE — TELEPHONE ENCOUNTER
One year of refills was approved in November of 2019.  New prescription, with one year of refills, will be sent tomorrow (when Dr. Oviedo is here to sign the prescription).

## 2020-01-22 ENCOUNTER — OFFICE VISIT (OUTPATIENT)
Dept: OPHTHALMOLOGY | Facility: CLINIC | Age: 75
End: 2020-01-22
Attending: STUDENT IN AN ORGANIZED HEALTH CARE EDUCATION/TRAINING PROGRAM
Payer: COMMERCIAL

## 2020-01-22 DIAGNOSIS — H02.9 LESION OF LEFT LOWER EYELID: Primary | ICD-10-CM

## 2020-01-22 PROCEDURE — 88305 TISSUE EXAM BY PATHOLOGIST: CPT | Performed by: OPHTHALMOLOGY

## 2020-01-22 PROCEDURE — 99213 OFFICE O/P EST LOW 20 MIN: CPT | Mod: 25 | Performed by: OPHTHALMOLOGY

## 2020-01-22 PROCEDURE — 92285 EXTERNAL OCULAR PHOTOGRAPHY: CPT | Mod: GC | Performed by: OPHTHALMOLOGY

## 2020-01-22 PROCEDURE — 67840 REMOVE EYELID LESION: CPT | Mod: E2 | Performed by: OPHTHALMOLOGY

## 2020-01-22 RX ORDER — ERYTHROMYCIN 5 MG/G
OINTMENT OPHTHALMIC
Qty: 1 TUBE | Refills: 1 | Status: SHIPPED | OUTPATIENT
Start: 2020-01-22 | End: 2020-10-06

## 2020-01-22 ASSESSMENT — CONF VISUAL FIELD
OS_NORMAL: 1
OD_NORMAL: 1

## 2020-01-22 ASSESSMENT — SLIT LAMP EXAM - LIDS: COMMENTS: NORMAL, MGD

## 2020-01-22 ASSESSMENT — TONOMETRY
IOP_METHOD: ICARE
OS_IOP_MMHG: 18
OD_IOP_MMHG: 18

## 2020-01-22 ASSESSMENT — VISUAL ACUITY
OS_CC: 20/20
CORRECTION_TYPE: GLASSES
METHOD: SNELLEN - LINEAR
OD_CC: 20/20
OS_CC+: -2

## 2020-01-22 ASSESSMENT — EXTERNAL EXAM - LEFT EYE: OS_EXAM: NORMAL

## 2020-01-22 ASSESSMENT — EXTERNAL EXAM - RIGHT EYE: OD_EXAM: NORMAL

## 2020-01-22 NOTE — LETTER
"January 27, 2020      Carson Park  [address] [facility] [address]        Dear Carson,    Please see below for your test results. Both lesions came back as benign (not worrisome) seborrheic keratosis. As long as you are healing well, no further care should be necessary. If you notice new spots or these lesions recur, please let your provider know.    Resulted Orders   Surgical pathology exam   Result Value Ref Range    Copath Report       Patient Name: CARSON PARK  MR#: [id number]  Specimen #: [id number]  Collected: 1/22/2020  Received: 1/22/2020  Reported: 1/24/2020 16:18  Ordering Phy(s): KIZZY MCDOWELL    For improved result formatting, select 'View Enhanced Report Format' under   Linked Documents section.    SPECIMEN(S):  A: Skin, left lower eyelid margin  B: Skin, left lower eyelid tear trough    FINAL DIAGNOSIS:  A. Skin, left lower eyelid margin:  - Seborrheic keratosis - (see description)    B. Skin, left lower eyelid tear trough:  - Macular seborrheic keratosis - (see description)    I have personally reviewed all specimens and/or slides, including the   listed special stains, and used them  with my medical judgement to determine or confirm the final diagnosis.    Electronically signed out by:    Benigno Sapp M.D., [facility]    CLINICAL HISTORY:  The patient is a 74-year-old female.    GROSS:  A:  The specimen is received in formalin with proper patient   identification, labeled \"L lower eyelid ma rgin\".  The specimen consists of 2 tan brown skin shave, 0.4 and 0.6 cm in   greatest dimension. Both shaves display  nodular, friable lesion covering the entire skin surface. The fragments   are differentially inked blue and  black and are submitted intact in A1    B:  The specimen is received in formalin with proper patient   identification, labeled \"L lower eyelid tear  trough\".  The specimen consists of a 0.3 x 0.3 cm skin shave which   displays a 0.3 x 0.2 x 0.1 cm " tan-white,  papillary lesion. The resection margin is inked blue and the specimen is   submitted in B1. (Dictated by:  LAURA Melara 1/22/2020 02:29 PM)    MICROSCOPIC:  A. The specimen exhibits compact orthokeratosis, papillomatous epidermal   hyperplasia with horn cyst formation  and increased keratinocyte pigmentation.    B. The specimen exhibits compact orthokeratosis, mildly papillomatous   epidermal hyperplasia, elongate rete  ridges and increased keratinocyte pigmentation.  There is no evidence of   malignan cy.    The technical component of this testing was completed at the [facility], with the professional component performed   at the [facility], [address] [place] [phone] ([phone])    CPT Codes:  A: 45624-ZK0  B: 22953-UW7    COLLECTION SITE:  Client: [facility]  Location: [facility]             If you have any questions, please call the clinic to make an appointment.    Sincerely,    Preeti Hernandez MD    Oculoplastic and Orbital Surgery   Department of Ophthalmology and Visual Neurosciences  [facility]

## 2020-01-22 NOTE — LETTER
2020         RE:  :  MRN: Marga Clark  1945  [id number]     Dear Dr. Oviedo,    Thank you for asking me to see your patient, Marga Clark, for an oculoplastic   consultation.  My assessment and plan are below.  For further details, please see my attached clinic note.      Chief Complaint(s) and History of Present Illness(es)     Lesion On Left Lower Lid     Laterality: left lower lid    Onset: years ago    Course: stable    Associated symptoms: Negative for lid pain and eye pain    Treatments tried: None              Comments     Patient is referred by Dr. Nora Oviedo for an evaluation of a lesion   on her left lower lid. Lesion has been there for years with little change.   Part of it peels off on occasion then comes back.     Ocular meds: Latanoprost each eye at bedtime.       Patient reports having the LLL lesion for years. She cannot remember when it started. The lesion might have grown at one time and then part of it sloughed off. No pain or itching. No drainage.   No other lesions on the eyelids before. No eye trauma, no eye infection/inflammation in the past.   No other skin lesions.   Never tried any ointment on the lesion.   No history of cutaneous malignancy  Assessment & Plan     Marga Clark is a 74 year old female with the following diagnoses:   1. Lesion of left lower eyelid     Concerning for basal cell carcinoma   Discussed biopsy and potential mohs and recon.       Again, thank you for allowing me to participate in the care of your patient.      Sincerely,    Preeti Hernandez MD  Department of Ophthalmology and Visual Neurosciences  [facility]    CC: Nora Oviedo MD  [address] [address]  VIA In Basket

## 2020-01-22 NOTE — NURSING NOTE
Chief Complaints and History of Present Illnesses   Patient presents with     Lesion On Left Lower Lid       Chief Complaint(s) and History of Present Illness(es)     Lesion On Left Lower Lid     Laterality: left lower lid    Onset: years ago    Course: stable    Associated symptoms: Negative for lid pain and eye pain    Treatments tried: None              Comments     Patient is referred by Dr. Nora Oviedo for an evaluation of a lesion on her left lower lid. Lesion has been there for years with little change. Part of it peels off on occasion then comes back.     Ocular meds: Latanoprost each eye at bedtime.                Melanie Jeans, OA

## 2020-01-22 NOTE — PROGRESS NOTES
Chief Complaint(s) and History of Present Illness(es)     Lesion On Left Lower Lid     Laterality: left lower lid    Onset: years ago    Course: stable    Associated symptoms: Negative for lid pain and eye pain    Treatments tried: None              Comments     Patient is referred by Dr. Nora Oviedo for an evaluation of a lesion   on her left lower lid. Lesion has been there for years with little change.   Part of it peels off on occasion then comes back.     Ocular meds: Latanoprost each eye at bedtime.       Patient reports having the LLL lesion for years. She cannot remember when it started. The lesion might have grown at one time and then part of it sloughed off. No pain or itching. No drainage.   No other lesions on the eyelids before. No eye trauma, no eye infection/inflammation in the past.   No other skin lesions.   Never tried any ointment on the lesion.   No history of cutaneous malignancy  Assessment & Plan     Marga Clark is a 74 year old female with the following diagnoses:   1. Lesion of left lower eyelid     Concerning for basal cell carcinoma   Discussed biopsy and potential mohs and recon.        Migue Sanders MD  Ophthalmology Resident- PGY-2    Attending Physician Attestation:  Complete documentation of historical and exam elements from today's encounter can be found in the full encounter summary report (not reduplicated in this progress note).  I personally obtained the chief complaint(s) and history of present illness.  I confirmed and edited as necessary the review of systems, past medical/surgical history, family history, social history, and examination findings as documented by others; and I examined the patient myself.  I personally reviewed the relevant tests, images, and reports as documented above.  I formulated and edited as necessary the assessment and plan and discussed the findings and management plan with the patient and family. I personally reviewed the ophthalmic  test(s) associated with this encounter, agree with the interpretation(s) as documented by the resident/fellow, and have edited the corresponding report(s) as necessary.   -Preeti Hernandez MD        Operative Note - Eyelid Biopsy      Jan 22, 2020    Pre-operative Diagnosis: Lesion left eye lower eyelid    Post-operative Diagnosis: Same.    Procedure: Excision of eyelid neoplasm.    Surgeon: Preeti Hernandez MD    Anesthesia: Local infiltration with 2% Lidocaine and Epinephrine.    Complications: None.    Estimated blood loss: <5 mL    Specimen: Eyelid neoplasm to pathology.    Procedure: The patient was brought to the minor procedure room and placed supine on the operating table.  The involved eyelid was infiltrated with local anesthetic.  The area was prepped and draped in the typical sterile fashion.  A tooth forceps was used to elevate the lesion and it was excised at its base with a Job scissors.  Hemostasis was obtained with a high temperature cautery.  The excised diameter measured 4 mm.  A second lesion below it in tear trough area was also excised and also left a 4 mm defect.     Closure of wound: granulation    Dressing: Rx for EES to [facility].     Disposition: The patient left the minor procedure room in stable condition.    I was present for the entire procedure. Preeti Hernandez MD

## 2020-01-24 LAB — COPATH REPORT: NORMAL

## 2020-01-24 NOTE — TELEPHONE ENCOUNTER
"Requested Prescriptions   Pending Prescriptions Disp Refills     Cyanocobalamin (B-12) 1000 MCG TBCR  Last Written Prescription Date:  3/28/2019  Last Fill Quantity: 90 tablet,  # refills: 2   Last office visit: 9/11/2019 with prescribing provider:  Jennifer Villela Office Visit:   Next 5 appointments (look out 90 days)    Feb 10, 2020  1:00 PM CST  [facility] Visit [facility] Zack [facility]  [facility] [address] [place] [id number]  [phone]          90 tablet 2     Sig: Take 1,000 mcg by mouth daily       Vitamin Supplements (Adult) Protocol Passed - 1/24/2020  4:06 PM        Passed - High dose Vitamin D not ordered        Passed - Recent (12 mo) or future (30 days) visit within the authorizing provider's specialty     Patient has had an office visit with the authorizing provider or a provider within the authorizing providers department within the previous 12 mos or has a future within next 30 days. See \"Patient Info\" tab in inbasket, or \"Choose Columns\" in Meds & Orders section of the refill encounter.              Passed - Medication is active on med list        montelukast (SINGULAIR) 10 MG tablet  Last Written Prescription Date:  7/19/2019  Last Fill Quantity: 90 tablet,  # refills: 1   Last office visit: 9/11/2019 with prescribing provider:  Jennifer Villela Office Visit:   Next 5 appointments (look out 90 days)    Feb 10, 2020  1:00 PM CST  Office Visit with Zack Hudson MD  [facility] [address] [place] [id number]  [phone]          90 tablet 1     Sig: Take 1 tablet (10 mg) by mouth At Bedtime       Leukotriene Inhibitors Protocol Failed - 1/24/2020  4:06 PM        Failed - Asthma control assessment score within normal limits in last 6 months     Please review ACT score. " Patient returning call - states a message was left to call Dr. Rolanda JAIME.   "  ACT Total Scores 12/14/2018 2/14/2019 7/8/2019   ACT TOTAL SCORE - - -   ASTHMA ER VISITS - - -   ASTHMA HOSPITALIZATIONS - - -   ACT TOTAL SCORE (Goal Greater than or Equal to 20) 18 16 20   In the past 12 months, how many times did you visit the emergency room for your asthma without being admitted to the hospital? 0 0 0   In the past 12 months, how many times were you hospitalized overnight because of your asthma? 0 0 0             Passed - Patient is age 12 or older     If patient is under 16, ok to refill using age based dosing.           Passed - Medication is active on med list        Passed - Recent (6 mo) or future (30 days) visit within the authorizing provider's specialty     Patient had office visit in the last 6 months or has a visit in the next 30 days with authorizing provider or within the authorizing provider's specialty.  See \"Patient Info\" tab in inbasket, or \"Choose Columns\" in Meds & Orders section of the refill encounter.               "

## 2020-02-04 DIAGNOSIS — M06.09 RHEUMATOID ARTHRITIS OF MULTIPLE SITES WITH NEGATIVE RHEUMATOID FACTOR (H): ICD-10-CM

## 2020-02-04 RX ORDER — HYDROXYCHLOROQUINE SULFATE 200 MG/1
200 TABLET, FILM COATED ORAL DAILY
Qty: 90 TABLET | Refills: 2 | Status: CANCELLED | OUTPATIENT
Start: 2020-02-04

## 2020-02-05 NOTE — TELEPHONE ENCOUNTER
hydroxychloroquine (PLAQUENIL) 200 MG tablet 90 tablet 2 12/2/2019  No   Sig - Route: Take 1 tablet (200 mg) by mouth daily - Oral   Sent to pharmacy as: hydroxychloroquine (PLAQUENIL) 200 MG tablet   Class: E-Prescribe   Order: [id number]   E-Prescribing Status: Receipt confirmed by pharmacy (12/2/2019  9:37 AM CST)     Duplicate request.   Closing encounter.     Pat More RN

## 2020-02-05 NOTE — TELEPHONE ENCOUNTER
Requested Prescriptions   Pending Prescriptions Disp Refills     hydroxychloroquine (PLAQUENIL) 200 MG tablet 90 tablet 2     Sig: Take 1 tablet (200 mg) by mouth daily       There is no refill protocol information for this order        Last Written Prescription Date:  12/2/19  Last Fill Quantity: 90,  # refills: 2   Last office visit: 10/14/2019 with prescribing provider:  Kody Urias Office Visit:

## 2020-05-01 ENCOUNTER — TELEPHONE (OUTPATIENT)
Dept: LAB | Facility: CLINIC | Age: 75
End: 2020-05-01

## 2020-05-01 NOTE — TELEPHONE ENCOUNTER
//.Left message for patient to call in regards to 24 hour pre-appointment COVID screening. Patient has an appointment at 0915 on 5/4/202020. Please ask infection screening questions and update appointment notes on 5/4/20 LAB schedule.

## 2020-05-04 DIAGNOSIS — M06.09 RHEUMATOID ARTHRITIS OF MULTIPLE SITES WITH NEGATIVE RHEUMATOID FACTOR (H): ICD-10-CM

## 2020-05-04 LAB
ALBUMIN SERPL-MCNC: 4 G/DL (ref 3.4–5)
ALP SERPL-CCNC: 99 U/L (ref 40–150)
ALT SERPL W P-5'-P-CCNC: 19 U/L (ref 0–50)
AST SERPL W P-5'-P-CCNC: 44 U/L (ref 0–45)
BASOPHILS # BLD AUTO: 0 10E9/L (ref 0–0.2)
BASOPHILS NFR BLD AUTO: 0.6 %
BILIRUB DIRECT SERPL-MCNC: 0.1 MG/DL (ref 0–0.2)
BILIRUB SERPL-MCNC: 0.8 MG/DL (ref 0.2–1.3)
CREAT SERPL-MCNC: 0.73 MG/DL (ref 0.52–1.04)
CRP SERPL-MCNC: 3.1 MG/L (ref 0–8)
DIFFERENTIAL METHOD BLD: NORMAL
EOSINOPHIL # BLD AUTO: 0.2 10E9/L (ref 0–0.7)
EOSINOPHIL NFR BLD AUTO: 2.2 %
ERYTHROCYTE [DISTWIDTH] IN BLOOD BY AUTOMATED COUNT: 13.5 % (ref 10–15)
ERYTHROCYTE [SEDIMENTATION RATE] IN BLOOD BY WESTERGREN METHOD: 23 MM/H (ref 0–30)
GFR SERPL CREATININE-BSD FRML MDRD: 82 ML/MIN/{1.73_M2}
HCT VFR BLD AUTO: 45 % (ref 35–47)
HGB BLD-MCNC: 14.7 G/DL (ref 11.7–15.7)
LYMPHOCYTES # BLD AUTO: 1.8 10E9/L (ref 0.8–5.3)
LYMPHOCYTES NFR BLD AUTO: 25 %
MCH RBC QN AUTO: 30.1 PG (ref 26.5–33)
MCHC RBC AUTO-ENTMCNC: 32.7 G/DL (ref 31.5–36.5)
MCV RBC AUTO: 92 FL (ref 78–100)
MONOCYTES # BLD AUTO: 0.6 10E9/L (ref 0–1.3)
MONOCYTES NFR BLD AUTO: 8.5 %
NEUTROPHILS # BLD AUTO: 4.6 10E9/L (ref 1.6–8.3)
NEUTROPHILS NFR BLD AUTO: 63.7 %
PLATELET # BLD AUTO: 212 10E9/L (ref 150–450)
PROT SERPL-MCNC: 8 G/DL (ref 6.8–8.8)
RBC # BLD AUTO: 4.88 10E12/L (ref 3.8–5.2)
WBC # BLD AUTO: 7.2 10E9/L (ref 4–11)

## 2020-05-04 PROCEDURE — 85025 COMPLETE CBC W/AUTO DIFF WBC: CPT | Performed by: INTERNAL MEDICINE

## 2020-05-04 PROCEDURE — 80076 HEPATIC FUNCTION PANEL: CPT | Performed by: INTERNAL MEDICINE

## 2020-05-04 PROCEDURE — 82565 ASSAY OF CREATININE: CPT | Performed by: INTERNAL MEDICINE

## 2020-05-04 PROCEDURE — 36415 COLL VENOUS BLD VENIPUNCTURE: CPT | Performed by: INTERNAL MEDICINE

## 2020-05-04 PROCEDURE — 85652 RBC SED RATE AUTOMATED: CPT | Performed by: INTERNAL MEDICINE

## 2020-05-04 PROCEDURE — 86140 C-REACTIVE PROTEIN: CPT | Performed by: INTERNAL MEDICINE

## 2020-05-11 ENCOUNTER — VIRTUAL VISIT (OUTPATIENT)
Dept: RHEUMATOLOGY | Facility: CLINIC | Age: 75
End: 2020-05-11
Payer: COMMERCIAL

## 2020-05-11 DIAGNOSIS — M06.09 RHEUMATOID ARTHRITIS OF MULTIPLE SITES WITH NEGATIVE RHEUMATOID FACTOR (H): ICD-10-CM

## 2020-05-11 PROCEDURE — 99213 OFFICE O/P EST LOW 20 MIN: CPT | Mod: 95 | Performed by: INTERNAL MEDICINE

## 2020-05-11 RX ORDER — HYDROXYCHLOROQUINE SULFATE 200 MG/1
200 TABLET, FILM COATED ORAL DAILY
Qty: 90 TABLET | Refills: 2 | Status: SHIPPED | OUTPATIENT
Start: 2020-05-11 | End: 2020-11-16

## 2020-05-11 NOTE — PROGRESS NOTES
"Marga Clark is a 74 year old female who is being evaluated via a billable telephone visit.      The patient has been notified of following:     \"This telephone visit will be conducted via a call between you and your physician/provider. We have found that certain health care needs can be provided without the need for a physical exam.  This service lets us provide the care you need with a short phone conversation.  If a prescription is necessary we can send it directly to your pharmacy.  If lab work is needed we can place an order for that and you can then stop by our lab to have the test done at a later time.    Telephone visits are billed at different rates depending on your insurance coverage. During this emergency period, for some insurers they may be billed the same as an in-person visit.  Please reach out to your insurance provider with any questions.    If during the course of the call the physician/provider feels a telephone visit is not appropriate, you will not be charged for this service.\"    Patient has given verbal consent for Telephone visit?  Yes    What phone number would you like to be contacted at? [phone]    How would you like to obtain your AVS? Mail a copy    Rheumatology Telephone Visit      Marga Clark MRN# [id number]   YOB: 1945 Age: 74 year old      Date of visit: 5/11/20   PCP: Dr. Tiarra Mak    Chief Complaint   Patient presents with:  Arthritis: feels the same    Assessment and Plan     1. Rheumatoid arthritis (RF negative [[facility]], CCP low positive): Dx'd 2011.  Has been followed by Prince Ritter ([place]) and Frances ([facility]).  Established with me on 10/30/2017. Previously on MTX (effective), prednisone (effective).  MTX and prednisone were stopped when she was doing well; inflammatory markers elevated so HCQ was started (per record review and patient interview).  Previously on HCQ 200mg BID and was doing well; reduced dose to 200mg daily and has " been doing well. No synovitis on exam.  Continue hydroxychloroquine 200 mg daily  - Continue hydroxychloroquine 200mg daily (last eye exam was on 7/9/2019 for visual fields with OCT planned to be in January 2020)  - Labs in 6 months: CBC, Creatinine, Hepatic Panel, ESR, CRP     2. R>L trochanteric bursitis, history: improved in the past with regular stretching exercises. Not an issue today.    # Relevant labs and imaging were reviewed with the patient    # High risk medication toxicity monitoring: discussion and labs reviewed; appropriate labs ordered. See above.  Instructed that if confirmed to have COVID-19 or exposure to someone with confirmed COVID-19 to call this clinic for directions on DMARD management.    # Note that this is a virtual visit to reduce the risk of COVID-19 exposure during this current pandemic.      Ms. Clark verbalized agreement with and understanding of the rational for the diagnosis and treatment plan.  All questions were answered to best of my ability and the patient's satisfaction. Ms. Clark was advised to contact the clinic with any questions that may arise after the clinic visit.     Thank you for involving me in the care of the patient    Return to clinic: 6 months      HPI   Marga Clark is a 74 year old female with a past medical history significant for inflammatory arthritis who presents for follow-up of inflammatory arthritis.    Today, Ms. Clark reports that she is is doing well.  Occasional joint ache that is brief and resolves quickly; only after working in a flower garden.  Tolerating hydroxychloroquine well.  Had her eye exam in January 2020. Social distancing.     Denies fevers, chills, nausea, vomiting, constipation, diarrhea. No abdominal pain. No chest pain/pressure, palpitations, or shortness of breath. No LE swelling. No neck pain. No oral or nasal sores.  No rash. No sicca symptoms.     Tobacco: 0.5 ppd  EtOH: Occasional  Drugs: None    ROS   GEN: No fevers, chills,  or night sweats  SKIN: No itching, rashes, sores  HEENT: No oral or nasal ulcers.  CV: No chest pain, pressure, palpitations, or dyspnea on exertion.  PULM: No SOB, wheeze, cough.  GI: No nausea, vomiting, constipation, diarrhea. No blood in stool. No abdominal pain.  : No blood in urine.  MSK: See HPI.  NEURO: No numbness, tingling, or weakness.  EXT: No LE swelling  PSYCH: Negative    Active Problem List     Patient Active Problem List   Diagnosis     CARDIOVASCULAR SCREENING; LDL GOAL LESS THAN 130     Advanced directives, counseling/discussion     High risk medications (not anticoagulants) long-term use     Inflammatory arthritis     Steroid long-term use     Rheumatoid arthritis of multiple sites with negative rheumatoid factor (H)     Adenomatous polyp of colon, unspecified part of colon     Tobacco abuse     Past Medical History     Past Medical History:   Diagnosis Date     Glaucoma (increased eye pressure)      Inflammatory arthritis 10/31/2011     Past Surgical History   History reviewed. No pertinent surgical history.  Allergy   No Known Allergies  Current Medication List     Current Outpatient Medications   Medication Sig     calcipotriene-betameth diprop (TACLONEX) 0.005-0.064 % external ointment Apply daily to affected area til clear     erythromycin (ROMYCIN) 5 MG/GM ophthalmic ointment Apply to incision sites three times a day for one week     hydroxychloroquine (PLAQUENIL) 200 MG tablet Take 1 tablet (200 mg) by mouth daily     latanoprost (XALATAN) 0.005 % ophthalmic solution Place 1 drop into both eyes At Bedtime     triamcinolone (ARISTOCORT HP) 0.5 % external cream Apply topically 2 times daily     triamcinolone (KENALOG) 0.5 % cream Apply to rash on feet bid til clear     No current facility-administered medications for this visit.          Social History   See HPI    Family History     Family History   Problem Relation Age of Onset     Cerebrovascular Disease Mother      Glaucoma Mother       "Heart Disease Father      Diabetes Maternal Grandfather      Diabetes Paternal Grandfather      Macular Degeneration No family hx of      Physical Exam     Temp Readings from Last 3 Encounters:   10/14/19 98.6  F (37  C) (Oral)   05/15/19 98.8  F (37.1  C) (Oral)   12/03/18 97.8  F (36.6  C) (Oral)     BP Readings from Last 5 Encounters:   12/02/19 134/64   10/14/19 132/64   06/03/19 148/73   05/15/19 137/67   12/03/18 148/79     Pulse Readings from Last 1 Encounters:   12/02/19 86     Resp Readings from Last 1 Encounters:   10/22/12 10     Estimated body mass index is 25.15 kg/m  as calculated from the following:    Height as of 12/2/19: 1.638 m (5' 4.5\").    Weight as of 12/2/19: 67.5 kg (148 lb 12.8 oz).    Telephone Visit     Labs / Imaging (select studies)     CBC  Recent Labs   Lab Test 05/04/20 0906 11/25/19 0924 05/29/19  0853   WBC 7.2 6.9 7.1   RBC 4.88 4.60 4.53   HGB 14.7 13.8 13.4   HCT 45.0 42.3 41.8   MCV 92 92 92   RDW 13.5 13.4 13.4    193 210   MCH 30.1 30.0 29.6   MCHC 32.7 32.6 32.1   NEUTROPHIL 63.7 62.8 60.9   LYMPH 25.0 25.4 27.4   MONOCYTE 8.5 9.4 9.0   EOSINOPHIL 2.2 2.0 2.1   BASOPHIL 0.6 0.4 0.6   ANEU 4.6 4.4 4.3   ALYM 1.8 1.8 1.9   MORENITA 0.6 0.7 0.6   AEOS 0.2 0.1 0.2   ABAS 0.0 0.0 0.0     CMP  Recent Labs   Lab Test 05/04/20  0906 11/25/19  0924 05/29/19  0853  07/14/17  0806 10/30/13  1003   NA  --   --   --   --   --  140   POTASSIUM  --   --   --   --   --  3.9   CHLORIDE  --   --   --   --   --  104   CO2  --   --   --   --   --  27   ANIONGAP  --   --   --   --   --  9   GLC  --   --   --   --  80 83   BUN  --   --   --   --   --  12   CR 0.73 0.69 0.67   < >  --  0.62   GFRESTIMATED 82 86 87   < >  --  >90   GFRESTBLACK >90 >90 >90   < >  --  >90   JOANNE  --   --   --   --   --  9.1   BILITOTAL 0.8 0.8 0.7   < >  --  0.8   ALBUMIN 4.0 4.0 4.0   < >  --  3.9   PROTTOTAL 8.0 7.5 7.7   < >  --  7.4   ALKPHOS 99 99 92   < >  --  88   AST 44 43 39   < >  --  43   ALT 19 21 23   " < >  --  29    < > = values in this interval not displayed.     Calcium/VitaminD  Recent Labs   Lab Test 10/30/13  1003   JOANNE 9.1     ESR/CRP  Recent Labs   Lab Test 05/04/20  0906 11/25/19  0924 05/29/19  0853   SED 23 21 20   CRP 3.1 6.2 <2.9     Hepatitis B  Recent Labs   Lab Test 01/22/18  0827   HBCAB Nonreactive   HEPBANG Nonreactive       Immunization History     Immunization History   Administered Date(s) Administered     Pneumo Conj 13-V (2010&after) 10/30/2017     Pneumococcal 23 valent 10/11/2011     TDAP Vaccine (Adacel) 01/10/2007, 07/05/2017     Zoster vaccine, live 07/28/2009          Chart documentation done in part with Dragon Voice recognition Software. Although reviewed after completion, some word and grammatical error may remain.    Phone call start time: 9:37 AM  Phone call end time: 9:45 AM    This visit is equivalent to a 09343 visit    Location of patient: home  Location of provider: home    Follow up:  follow up appointment scheduled to be in Sandi Urias MD  5/11/2020

## 2020-07-02 DIAGNOSIS — H40.1121 PRIMARY OPEN ANGLE GLAUCOMA OF LEFT EYE, MILD STAGE: ICD-10-CM

## 2020-07-02 RX ORDER — LATANOPROST 50 UG/ML
1 SOLUTION/ DROPS OPHTHALMIC AT BEDTIME
Qty: 1 BOTTLE | Refills: 12 | Status: SHIPPED | OUTPATIENT
Start: 2020-07-02 | End: 2021-01-01

## 2020-07-02 NOTE — TELEPHONE ENCOUNTER
Will refill Latanoprost for patient to [facility] and send to Dr. Oviedo to approve. Patient is high risk for COVID and can come in at a later date.

## 2020-07-02 NOTE — TELEPHONE ENCOUNTER
Pt called stating that she needs a refill on her medication Latanoprost. Pt had an appt schedule next week with [facility], but doesn't feel safe to come in, so she rescheduled for Oct, but stated she did need this refill. Any questions contact pt

## 2020-10-06 ENCOUNTER — OFFICE VISIT (OUTPATIENT)
Dept: OPHTHALMOLOGY | Facility: CLINIC | Age: 75
End: 2020-10-06
Payer: COMMERCIAL

## 2020-10-06 DIAGNOSIS — Z79.899 HIGH RISK MEDICATIONS (NOT ANTICOAGULANTS) LONG-TERM USE: ICD-10-CM

## 2020-10-06 DIAGNOSIS — H40.1121 PRIMARY OPEN ANGLE GLAUCOMA OF LEFT EYE, MILD STAGE: Primary | ICD-10-CM

## 2020-10-06 DIAGNOSIS — M19.90 INFLAMMATORY ARTHRITIS: ICD-10-CM

## 2020-10-06 PROCEDURE — 92133 CPTRZD OPH DX IMG PST SGM ON: CPT | Performed by: STUDENT IN AN ORGANIZED HEALTH CARE EDUCATION/TRAINING PROGRAM

## 2020-10-06 PROCEDURE — 92083 EXTENDED VISUAL FIELD XM: CPT | Performed by: STUDENT IN AN ORGANIZED HEALTH CARE EDUCATION/TRAINING PROGRAM

## 2020-10-06 PROCEDURE — 92012 INTRM OPH EXAM EST PATIENT: CPT | Performed by: STUDENT IN AN ORGANIZED HEALTH CARE EDUCATION/TRAINING PROGRAM

## 2020-10-06 RX ORDER — DORZOLAMIDE HYDROCHLORIDE AND TIMOLOL MALEATE 20; 5 MG/ML; MG/ML
1 SOLUTION/ DROPS OPHTHALMIC 2 TIMES DAILY
Qty: 1 BOTTLE | Refills: 11 | Status: SHIPPED | OUTPATIENT
Start: 2020-10-06 | End: 2021-01-01

## 2020-10-06 ASSESSMENT — TONOMETRY
OS_IOP_MMHG: 14
IOP_METHOD: APPLANATION
OD_IOP_MMHG: 15

## 2020-10-06 ASSESSMENT — CUP TO DISC RATIO
OD_RATIO: 0.8
OS_RATIO: 0.85

## 2020-10-06 ASSESSMENT — EXTERNAL EXAM - RIGHT EYE: OD_EXAM: NORMAL

## 2020-10-06 ASSESSMENT — CONF VISUAL FIELD
OS_NORMAL: 1
OD_NORMAL: 1

## 2020-10-06 ASSESSMENT — VISUAL ACUITY
CORRECTION_TYPE: GLASSES
OS_CC: 20/25
OD_CC: 20/20
OD_CC+: -1
OS_CC+: +3
METHOD: SNELLEN - LINEAR

## 2020-10-06 ASSESSMENT — SLIT LAMP EXAM - LIDS: COMMENTS: NORMAL, MGD

## 2020-10-06 ASSESSMENT — EXTERNAL EXAM - LEFT EYE: OS_EXAM: NORMAL

## 2020-10-06 NOTE — PROGRESS NOTES
Current Eye Medications:  Latanoprost every evening both eyes (10:30pm)     Subjective:  Follow up with HVF and OCT today. PT denies vision changes since last visit. No floaters or flashes. No eye discomfort or irritation.      Objective:  See Ophthalmology Exam.      Assessment:  Marga Clark is a 75 year old female who presents with:     Primary open angle glaucoma of left eye, mild stage Increased thinning right eye per OCT. Intraocular pressure 15/14 today. Discussed adding Cosopt versus Selected laser trabeculoplasty (SLT) - she prefers drops for now. Will add Cosopt twice a day to right eye.     OCT optic nerve: avg retinal nerve fiber layer 75/72 (prev 78/68, and prior to that 84/70).     Gaitan visual field (HVF) 24-2: mild scattered loss both eyes.        High risk medications (not anticoagulants) long-term use Plaquenil x 6 years (started 7/2014).     OCT macula: within normal limits right eye; Epiretinal membrane, otherwise within normal limits left eye.     Visual field as above.     Inflammatory arthritis        Plan:  Continue Latanoprost (green top) at bedtime both eyes      Add Cosopt (dorzolamide-timolol -- dark blue top) twice a day to right eye     Normal Plaquenil eye tests today. Will let Dr. Urias know.    Nora Oviedo MD  [phone]

## 2020-10-06 NOTE — PATIENT INSTRUCTIONS
Continue Latanoprost (green top) at bedtime both eyes     Add Cosopt (dorzolamide-timolol -- dark blue top) twice a day to right eye     Normal Plaquenil eye tests today. Will let Dr. Urias know.    Nora Oviedo MD  [phone]

## 2020-10-06 NOTE — LETTER
10/6/2020       RE: Marga Clark  [address] [facility] [id number]      Dear Dr. Urias,    Thank you for referring your patient, Marga Clark, to the [facility]. Her Plaquenil eye tests were normal today. Plan to repeat tests in 1 year.  Please see a copy of my visit note below.     Current Eye Medications:  Latanoprost every evening both eyes (10:30pm)     Subjective:  Follow up with HVF and OCT today. PT denies vision changes since last visit. No floaters or flashes. No eye discomfort or irritation.      Objective:  See Ophthalmology Exam.      Assessment:  Marga Clark is a 75 year old female who presents with:     Primary open angle glaucoma of left eye, mild stage Increased thinning right eye per OCT. Intraocular pressure 15/14 today. Discussed adding Cosopt versus Selected laser trabeculoplasty (SLT) - she prefers drops for now. Will add Cosopt twice a day to right eye.     OCT optic nerve: avg retinal nerve fiber layer 75/72 (prev 78/68, and prior to that 84/70).     Gaitan visual field (HVF) 24-2: mild scattered loss both eyes.        High risk medications (not anticoagulants) long-term use Plaquenil x 6 years (started 7/2014).     OCT macula: within normal limits right eye; Epiretinal membrane, otherwise within normal limits left eye.     Visual field as above.     Inflammatory arthritis        Plan:  Continue Latanoprost (green top) at bedtime both eyes      Add Cosopt (dorzolamide-timolol -- dark blue top) twice a day to right eye     Normal Plaquenil eye tests today. Will let Dr. Urias know.    Nora Oviedo MD  [phone]                 Again, thank you for allowing me to participate in the care of your patient.        Sincerely,        Nora Oviedo MD

## 2020-11-02 DIAGNOSIS — M06.09 RHEUMATOID ARTHRITIS OF MULTIPLE SITES WITH NEGATIVE RHEUMATOID FACTOR (H): ICD-10-CM

## 2020-11-02 LAB
ALBUMIN SERPL-MCNC: 3.5 G/DL (ref 3.4–5)
ALP SERPL-CCNC: 105 U/L (ref 40–150)
ALT SERPL W P-5'-P-CCNC: 23 U/L (ref 0–50)
AST SERPL W P-5'-P-CCNC: 38 U/L (ref 0–45)
BASOPHILS # BLD AUTO: 0 10E9/L (ref 0–0.2)
BASOPHILS NFR BLD AUTO: 0.5 %
BILIRUB DIRECT SERPL-MCNC: 0.2 MG/DL (ref 0–0.2)
BILIRUB SERPL-MCNC: 0.9 MG/DL (ref 0.2–1.3)
CREAT SERPL-MCNC: 0.6 MG/DL (ref 0.52–1.04)
CRP SERPL-MCNC: 12.7 MG/L (ref 0–8)
DIFFERENTIAL METHOD BLD: NORMAL
EOSINOPHIL # BLD AUTO: 0.2 10E9/L (ref 0–0.7)
EOSINOPHIL NFR BLD AUTO: 2.9 %
ERYTHROCYTE [DISTWIDTH] IN BLOOD BY AUTOMATED COUNT: 13.3 % (ref 10–15)
ERYTHROCYTE [SEDIMENTATION RATE] IN BLOOD BY WESTERGREN METHOD: 37 MM/H (ref 0–30)
GFR SERPL CREATININE-BSD FRML MDRD: 90 ML/MIN/{1.73_M2}
HCT VFR BLD AUTO: 42.4 % (ref 35–47)
HGB BLD-MCNC: 13.7 G/DL (ref 11.7–15.7)
LYMPHOCYTES # BLD AUTO: 2.1 10E9/L (ref 0.8–5.3)
LYMPHOCYTES NFR BLD AUTO: 28.6 %
MCH RBC QN AUTO: 29.7 PG (ref 26.5–33)
MCHC RBC AUTO-ENTMCNC: 32.3 G/DL (ref 31.5–36.5)
MCV RBC AUTO: 92 FL (ref 78–100)
MONOCYTES # BLD AUTO: 0.7 10E9/L (ref 0–1.3)
MONOCYTES NFR BLD AUTO: 8.9 %
NEUTROPHILS # BLD AUTO: 4.3 10E9/L (ref 1.6–8.3)
NEUTROPHILS NFR BLD AUTO: 59.1 %
PLATELET # BLD AUTO: 234 10E9/L (ref 150–450)
PROT SERPL-MCNC: 7.4 G/DL (ref 6.8–8.8)
RBC # BLD AUTO: 4.62 10E12/L (ref 3.8–5.2)
WBC # BLD AUTO: 7.3 10E9/L (ref 4–11)

## 2020-11-02 PROCEDURE — 86140 C-REACTIVE PROTEIN: CPT | Performed by: INTERNAL MEDICINE

## 2020-11-02 PROCEDURE — 80076 HEPATIC FUNCTION PANEL: CPT | Performed by: INTERNAL MEDICINE

## 2020-11-02 PROCEDURE — 85025 COMPLETE CBC W/AUTO DIFF WBC: CPT | Performed by: INTERNAL MEDICINE

## 2020-11-02 PROCEDURE — 85652 RBC SED RATE AUTOMATED: CPT | Performed by: INTERNAL MEDICINE

## 2020-11-02 PROCEDURE — 36415 COLL VENOUS BLD VENIPUNCTURE: CPT | Performed by: INTERNAL MEDICINE

## 2020-11-02 PROCEDURE — 82565 ASSAY OF CREATININE: CPT | Performed by: INTERNAL MEDICINE

## 2020-11-05 NOTE — RESULT ENCOUNTER NOTE
"Please mail Ms. Clark her results with the following message.    \"Ms. Clark,    Inflammatory markers ESR and CRP are elevated.  Other labs are normal. We will review at your upcoming appointment.     Sincerely,  Kody Urias MD  11/5/2020\"  "

## 2020-11-16 ENCOUNTER — VIRTUAL VISIT (OUTPATIENT)
Dept: RHEUMATOLOGY | Facility: CLINIC | Age: 75
End: 2020-11-16
Payer: COMMERCIAL

## 2020-11-16 DIAGNOSIS — Z79.899 HIGH RISK MEDICATIONS (NOT ANTICOAGULANTS) LONG-TERM USE: ICD-10-CM

## 2020-11-16 DIAGNOSIS — M06.09 RHEUMATOID ARTHRITIS OF MULTIPLE SITES WITH NEGATIVE RHEUMATOID FACTOR (H): Primary | ICD-10-CM

## 2020-11-16 PROCEDURE — 99213 OFFICE O/P EST LOW 20 MIN: CPT | Mod: 95 | Performed by: INTERNAL MEDICINE

## 2020-11-16 RX ORDER — HYDROXYCHLOROQUINE SULFATE 200 MG/1
TABLET, FILM COATED ORAL
Qty: 135 TABLET | Refills: 1 | Status: SHIPPED | OUTPATIENT
Start: 2020-11-16 | End: 2021-02-15

## 2020-11-16 NOTE — PROGRESS NOTES
"Marga Clark is a 75 year old female who is being evaluated via a billable telephone visit.      The patient has been notified of following:     \"This telephone visit will be conducted via a call between you and your physician/provider. We have found that certain health care needs can be provided without the need for a physical exam.  This service lets us provide the care you need with a short phone conversation.  If a prescription is necessary we can send it directly to your pharmacy.  If lab work is needed we can place an order for that and you can then stop by our lab to have the test done at a later time.    Telephone visits are billed at different rates depending on your insurance coverage. During this emergency period, for some insurers they may be billed the same as an in-person visit.  Please reach out to your insurance provider with any questions.    If during the course of the call the physician/provider feels a telephone visit is not appropriate, you will not be charged for this service.\"    Patient has given verbal consent for Telephone visit?  Yes    What phone number would you like to be contacted at? [phone]    How would you like to obtain your AVS? Mail a copy     Naomie Singh CMA Rheumatology  11/16/2020 8:34 AM    Rheumatology Telephone/Telehealth  Visit      Marga Clark MRN# [id number]   YOB: 1945 Age: 75 year old      Date of visit: 11/16/20   PCP: Dr. Tiarra Mak    Chief Complaint   Patient presents with:  Arthritis: RA, sore    Assessment and Plan     1. Rheumatoid arthritis (RF negative [[facility]], CCP low positive): [place] 2011.  Has been followed by Prince Ritter ([place]) and Frances ([facility]).  Established with me on 10/30/2017. Previously on MTX (effective), prednisone (effective).  MTX and prednisone were stopped when she was doing well; inflammatory markers elevated so HCQ was started (per record review and patient interview).  Today with more " inflammatory symptoms and we discussed increasing hydroxychloroquine versus adding MTX 15mg once weekly; suspect she would do better with adding MTX; after thorough discussion will increase HCQ.  - Increase hydroxychloroquine from 200mg daily, to 200mg daily with an additional 200mg every other day (last eye exam was on 10/6/2020 with Dr. Oviedo)  - Labs in 3 months: CBC, Creatinine, Hepatic Panel, ESR, CRP    2. R>L trochanteric bursitis, history: improved in the past with regular stretching exercises. Not an issue today.    # Relevant labs and imaging were reviewed with the patient    # High risk medication toxicity monitoring: discussion and labs reviewed; appropriate labs ordered. See above.  Instructed that if confirmed to have COVID-19 or exposure to someone with confirmed COVID-19 to call this clinic for directions on DMARD management.    # Note that this is a virtual visit to reduce the risk of COVID-19 exposure during this current pandemic.      # Considered to be at high risk of complications from the COVID-19 virus.  It is recommended to limit contact with other people and if possible to work remotely or provide a leave of absence to reduce the risk for COVID-19.      Ms. Clark verbalized agreement with and understanding of the rational for the diagnosis and treatment plan.  All questions were answered to best of my ability and the patient's satisfaction. Ms. Clark was advised to contact the clinic with any questions that may arise after the clinic visit.     Thank you for involving me in the care of the patient    Return to clinic: 3 months      HPI   Marga Clark is a 75 year old female with a past medical history significant for inflammatory arthritis who presents for follow-up of inflammatory arthritis.    Today, Ms. Clark reports that she wakes up with pain and stiffness in the hands, dropping items more.  Morning stiffness for about 30-60 min.  No joint swelling.  Tolerating hydroxychloroquine  well.      Denies fevers, chills, nausea, vomiting, constipation, diarrhea. No abdominal pain. No chest pain/pressure, palpitations, or shortness of breath. No LE swelling. No neck pain. No oral or nasal sores.  No rash. No sicca symptoms.     Tobacco: 0.5 ppd  EtOH: Occasional  Drugs: None    ROS   GEN: No fevers, chills, or night sweats  SKIN: No itching, rashes, sores  HEENT: No oral or nasal ulcers.  CV: No chest pain, pressure, palpitations, or dyspnea on exertion.  PULM: No SOB, wheeze, cough.  GI: No nausea, vomiting, constipation, diarrhea. No blood in stool. No abdominal pain.  MSK: See HPI.  NEURO: No numbness, tingling, or weakness.  EXT: No LE swelling  PSYCH: Negative    Active Problem List     Patient Active Problem List   Diagnosis     CARDIOVASCULAR SCREENING; LDL GOAL LESS THAN 130     Advanced directives, counseling/discussion     High risk medications (not anticoagulants) long-term use     Inflammatory arthritis     Steroid long-term use     Rheumatoid arthritis of multiple sites with negative rheumatoid factor (H)     Adenomatous polyp of colon, unspecified part of colon     Tobacco abuse     Past Medical History     Past Medical History:   Diagnosis Date     Glaucoma (increased eye pressure)      Inflammatory arthritis 10/31/2011     Past Surgical History   History reviewed. No pertinent surgical history.  Allergy   No Known Allergies  Current Medication List     Current Outpatient Medications   Medication Sig     calcipotriene-betameth diprop (TACLONEX) 0.005-0.064 % external ointment Apply daily to affected area til clear     dorzolamide-timolol (COSOPT) 2-0.5 % ophthalmic solution Place 1 drop into the right eye 2 times daily     hydroxychloroquine (PLAQUENIL) 200 MG tablet Take 1 tablet (200 mg) by mouth daily     latanoprost (XALATAN) 0.005 % ophthalmic solution Place 1 drop into both eyes At Bedtime     triamcinolone (ARISTOCORT HP) 0.5 % external cream Apply topically 2 times daily      "triamcinolone (KENALOG) 0.5 % cream Apply to rash on feet bid til clear     No current facility-administered medications for this visit.          Social History   See HPI    Family History     Family History   Problem Relation Age of Onset     Cerebrovascular Disease Mother      Glaucoma Mother      Heart Disease Father      Diabetes Maternal Grandfather      Diabetes Paternal Grandfather      Macular Degeneration No family hx of      Physical Exam     Temp Readings from Last 3 Encounters:   10/14/19 98.6  F (37  C) (Oral)   05/15/19 98.8  F (37.1  C) (Oral)   12/03/18 97.8  F (36.6  C) (Oral)     BP Readings from Last 5 Encounters:   12/02/19 134/64   10/14/19 132/64   06/03/19 148/73   05/15/19 137/67   12/03/18 148/79     Pulse Readings from Last 1 Encounters:   12/02/19 86     Resp Readings from Last 1 Encounters:   10/22/12 10     Estimated body mass index is 25.15 kg/m  as calculated from the following:    Height as of 12/2/19: 1.638 m (5' 4.5\").    Weight as of 12/2/19: 67.5 kg (148 lb 12.8 oz).    Telephone Visit     Labs / Imaging (select studies)     CBC  Recent Labs   Lab Test 11/02/20 0927 05/04/20 0906 11/25/19  0924   WBC 7.3 7.2 6.9   RBC 4.62 4.88 4.60   HGB 13.7 14.7 13.8   HCT 42.4 45.0 42.3   MCV 92 92 92   RDW 13.3 13.5 13.4    212 193   MCH 29.7 30.1 30.0   MCHC 32.3 32.7 32.6   NEUTROPHIL 59.1 63.7 62.8   LYMPH 28.6 25.0 25.4   MONOCYTE 8.9 8.5 9.4   EOSINOPHIL 2.9 2.2 2.0   BASOPHIL 0.5 0.6 0.4   ANEU 4.3 4.6 4.4   ALYM 2.1 1.8 1.8   MORENITA 0.7 0.6 0.7   AEOS 0.2 0.2 0.1   ABAS 0.0 0.0 0.0     CMP  Recent Labs   Lab Test 11/02/20  0927 05/04/20  0906 11/25/19  0924 07/14/17  0806 07/14/17  0806 10/30/13  1003   NA  --   --   --   --   --  140   POTASSIUM  --   --   --   --   --  3.9   CHLORIDE  --   --   --   --   --  104   CO2  --   --   --   --   --  27   ANIONGAP  --   --   --   --   --  9   GLC  --   --   --   --  80 83   BUN  --   --   --   --   --  12   CR 0.60 0.73 0.69   < >  -- "  0.62   GFRESTIMATED 90 82 86   < >  --  >90   GFRESTBLACK >90 >90 >90   < >  --  >90   JOANNE  --   --   --   --   --  9.1   BILITOTAL 0.9 0.8 0.8   < >  --  0.8   ALBUMIN 3.5 4.0 4.0   < >  --  3.9   PROTTOTAL 7.4 8.0 7.5   < >  --  7.4   ALKPHOS 105 99 99   < >  --  88   AST 38 44 43   < >  --  43   ALT 23 19 21   < >  --  29    < > = values in this interval not displayed.     Calcium/VitaminD  Recent Labs   Lab Test 10/30/13  1003   JOANNE 9.1     ESR/CRP  Recent Labs   Lab Test 11/02/20  0927 05/04/20  0906 11/25/19  0924   SED 37* 23 21   CRP 12.7* 3.1 6.2       Hepatitis B  Recent Labs   Lab Test 01/22/18  0827   HBCAB Nonreactive   HEPBANG Nonreactive     Immunization History     Immunization History   Administered Date(s) Administered     Pneumo Conj 13-V (2010&after) 10/30/2017     Pneumococcal 23 valent 10/11/2011     TDAP Vaccine (Adacel) 01/10/2007, 07/05/2017     Zoster vaccine, live 07/28/2009          Chart documentation done in part with Dragon Voice recognition Software. Although reviewed after completion, some word and grammatical error may remain.      Phone call start time: 9:28 AM  Phone call end time: 9:36 AM    This visit is equivalent to a 54954 visit    Location of patient: home, [place]  Location of provider: [place]    Follow up:  follow up appointment scheduled to be in Feb    Kody Urias MD

## 2021-01-01 ENCOUNTER — TELEPHONE (OUTPATIENT)
Dept: RHEUMATOLOGY | Facility: CLINIC | Age: 76
End: 2021-01-01

## 2021-01-01 ENCOUNTER — OFFICE VISIT (OUTPATIENT)
Dept: OPHTHALMOLOGY | Facility: CLINIC | Age: 76
End: 2021-01-01
Payer: COMMERCIAL

## 2021-01-01 ENCOUNTER — LAB (OUTPATIENT)
Dept: LAB | Facility: CLINIC | Age: 76
End: 2021-01-01
Payer: COMMERCIAL

## 2021-01-01 ENCOUNTER — OFFICE VISIT (OUTPATIENT)
Dept: RHEUMATOLOGY | Facility: CLINIC | Age: 76
End: 2021-01-01
Payer: COMMERCIAL

## 2021-01-01 VITALS
DIASTOLIC BLOOD PRESSURE: 84 MMHG | SYSTOLIC BLOOD PRESSURE: 160 MMHG | HEIGHT: 65 IN | BODY MASS INDEX: 21.52 KG/M2 | WEIGHT: 129.2 LBS | OXYGEN SATURATION: 98 % | HEART RATE: 78 BPM

## 2021-01-01 DIAGNOSIS — R21 RASH: ICD-10-CM

## 2021-01-01 DIAGNOSIS — H40.1121 PRIMARY OPEN ANGLE GLAUCOMA OF LEFT EYE, MILD STAGE: ICD-10-CM

## 2021-01-01 DIAGNOSIS — Z79.899 HIGH RISK MEDICATIONS (NOT ANTICOAGULANTS) LONG-TERM USE: ICD-10-CM

## 2021-01-01 DIAGNOSIS — H40.1121 PRIMARY OPEN ANGLE GLAUCOMA OF LEFT EYE, MILD STAGE: Primary | ICD-10-CM

## 2021-01-01 DIAGNOSIS — M06.09 RHEUMATOID ARTHRITIS OF MULTIPLE SITES WITH NEGATIVE RHEUMATOID FACTOR (H): ICD-10-CM

## 2021-01-01 DIAGNOSIS — M19.90 INFLAMMATORY ARTHRITIS: ICD-10-CM

## 2021-01-01 DIAGNOSIS — M06.09 RHEUMATOID ARTHRITIS OF MULTIPLE SITES WITH NEGATIVE RHEUMATOID FACTOR (H): Primary | ICD-10-CM

## 2021-01-01 LAB
ALBUMIN SERPL-MCNC: 3.9 G/DL (ref 3.4–5)
ALP SERPL-CCNC: 105 U/L (ref 40–150)
ALT SERPL W P-5'-P-CCNC: 22 U/L (ref 0–50)
AST SERPL W P-5'-P-CCNC: 33 U/L (ref 0–45)
BASOPHILS # BLD AUTO: 0 10E3/UL (ref 0–0.2)
BASOPHILS NFR BLD AUTO: 1 %
BILIRUB DIRECT SERPL-MCNC: 0.1 MG/DL (ref 0–0.2)
BILIRUB SERPL-MCNC: 0.6 MG/DL (ref 0.2–1.3)
CREAT SERPL-MCNC: 0.67 MG/DL (ref 0.52–1.04)
CRP SERPL-MCNC: 7 MG/L (ref 0–8)
EOSINOPHIL # BLD AUTO: 0.2 10E3/UL (ref 0–0.7)
EOSINOPHIL NFR BLD AUTO: 2 %
ERYTHROCYTE [DISTWIDTH] IN BLOOD BY AUTOMATED COUNT: 14.2 % (ref 10–15)
ERYTHROCYTE [SEDIMENTATION RATE] IN BLOOD BY WESTERGREN METHOD: 5 MM/HR (ref 0–30)
GFR SERPL CREATININE-BSD FRML MDRD: 87 ML/MIN/1.73M2
HCT VFR BLD AUTO: 43.2 % (ref 35–47)
HGB BLD-MCNC: 14.1 G/DL (ref 11.7–15.7)
LYMPHOCYTES # BLD AUTO: 2 10E3/UL (ref 0.8–5.3)
LYMPHOCYTES NFR BLD AUTO: 25 %
MCH RBC QN AUTO: 29.9 PG (ref 26.5–33)
MCHC RBC AUTO-ENTMCNC: 32.6 G/DL (ref 31.5–36.5)
MCV RBC AUTO: 92 FL (ref 78–100)
MONOCYTES # BLD AUTO: 0.7 10E3/UL (ref 0–1.3)
MONOCYTES NFR BLD AUTO: 9 %
NEUTROPHILS # BLD AUTO: 5.1 10E3/UL (ref 1.6–8.3)
NEUTROPHILS NFR BLD AUTO: 64 %
PLATELET # BLD AUTO: 232 10E3/UL (ref 150–450)
PROT SERPL-MCNC: 7.9 G/DL (ref 6.8–8.8)
RBC # BLD AUTO: 4.71 10E6/UL (ref 3.8–5.2)
WBC # BLD AUTO: 8 10E3/UL (ref 4–11)

## 2021-01-01 PROCEDURE — 80076 HEPATIC FUNCTION PANEL: CPT

## 2021-01-01 PROCEDURE — 85025 COMPLETE CBC W/AUTO DIFF WBC: CPT

## 2021-01-01 PROCEDURE — 85652 RBC SED RATE AUTOMATED: CPT

## 2021-01-01 PROCEDURE — 82565 ASSAY OF CREATININE: CPT

## 2021-01-01 PROCEDURE — 92133 CPTRZD OPH DX IMG PST SGM ON: CPT | Performed by: STUDENT IN AN ORGANIZED HEALTH CARE EDUCATION/TRAINING PROGRAM

## 2021-01-01 PROCEDURE — 86140 C-REACTIVE PROTEIN: CPT

## 2021-01-01 PROCEDURE — 36415 COLL VENOUS BLD VENIPUNCTURE: CPT

## 2021-01-01 PROCEDURE — 92083 EXTENDED VISUAL FIELD XM: CPT | Performed by: STUDENT IN AN ORGANIZED HEALTH CARE EDUCATION/TRAINING PROGRAM

## 2021-01-01 PROCEDURE — 92012 INTRM OPH EXAM EST PATIENT: CPT | Performed by: STUDENT IN AN ORGANIZED HEALTH CARE EDUCATION/TRAINING PROGRAM

## 2021-01-01 PROCEDURE — 99214 OFFICE O/P EST MOD 30 MIN: CPT | Performed by: INTERNAL MEDICINE

## 2021-01-01 RX ORDER — CALCIPOTRIENE, BETAMETHASONE DIPROPIONATE 50; .643 UG/G; MG/G
OINTMENT TOPICAL
Qty: 60 G | Refills: 3 | OUTPATIENT
Start: 2021-01-01

## 2021-01-01 RX ORDER — LATANOPROST 50 UG/ML
1 SOLUTION/ DROPS OPHTHALMIC AT BEDTIME
Qty: 2.5 ML | Refills: 11 | Status: SHIPPED | OUTPATIENT
Start: 2021-01-01

## 2021-01-01 RX ORDER — HYDROXYCHLOROQUINE SULFATE 200 MG/1
TABLET, FILM COATED ORAL
Qty: 135 TABLET | Refills: 1 | Status: SHIPPED | OUTPATIENT
Start: 2021-01-01 | End: 2022-01-01

## 2021-01-01 RX ORDER — DORZOLAMIDE HYDROCHLORIDE AND TIMOLOL MALEATE 20; 5 MG/ML; MG/ML
1 SOLUTION/ DROPS OPHTHALMIC 2 TIMES DAILY
Qty: 20 ML | Refills: 3 | Status: SHIPPED | OUTPATIENT
Start: 2021-01-01

## 2021-01-01 ASSESSMENT — TONOMETRY
IOP_METHOD: APPLANATION
OD_IOP_MMHG: 14
OD_IOP_MMHG: 12
IOP_METHOD: APPLANATION
OS_IOP_MMHG: 15
OS_IOP_MMHG: 15

## 2021-01-01 ASSESSMENT — SLIT LAMP EXAM - LIDS
COMMENTS: NORMAL, MGD
COMMENTS: NORMAL, MGD

## 2021-01-01 ASSESSMENT — VISUAL ACUITY
OS_CC: 20/25
OS_CC: 20/25
OD_CC: 20/20
CORRECTION_TYPE: GLASSES
OS_CC+: +2
METHOD: SNELLEN - LINEAR
OD_CC+: -2
METHOD: SNELLEN - LINEAR
CORRECTION_TYPE: GLASSES
OD_CC: 20/25
OD_CC+: -1

## 2021-01-01 ASSESSMENT — CUP TO DISC RATIO
OS_RATIO: 0.85
OS_RATIO: 0.85
OD_RATIO: 0.8
OD_RATIO: 0.8

## 2021-01-01 ASSESSMENT — EXTERNAL EXAM - LEFT EYE
OS_EXAM: NORMAL
OS_EXAM: NORMAL

## 2021-01-01 ASSESSMENT — EXTERNAL EXAM - RIGHT EYE
OD_EXAM: NORMAL
OD_EXAM: NORMAL

## 2021-01-01 ASSESSMENT — MIFFLIN-ST. JEOR: SCORE: 1078.99

## 2021-02-10 DIAGNOSIS — Z79.899 HIGH RISK MEDICATIONS (NOT ANTICOAGULANTS) LONG-TERM USE: ICD-10-CM

## 2021-02-10 DIAGNOSIS — M06.09 RHEUMATOID ARTHRITIS OF MULTIPLE SITES WITH NEGATIVE RHEUMATOID FACTOR (H): ICD-10-CM

## 2021-02-10 LAB
ALBUMIN SERPL-MCNC: 3.8 G/DL (ref 3.4–5)
ALP SERPL-CCNC: 101 U/L (ref 40–150)
ALT SERPL W P-5'-P-CCNC: 19 U/L (ref 0–50)
AST SERPL W P-5'-P-CCNC: 33 U/L (ref 0–45)
BASOPHILS # BLD AUTO: 0 10E9/L (ref 0–0.2)
BASOPHILS NFR BLD AUTO: 0.4 %
BILIRUB DIRECT SERPL-MCNC: 0.2 MG/DL (ref 0–0.2)
BILIRUB SERPL-MCNC: 0.9 MG/DL (ref 0.2–1.3)
CREAT SERPL-MCNC: 0.67 MG/DL (ref 0.52–1.04)
CRP SERPL-MCNC: 11.8 MG/L (ref 0–8)
DIFFERENTIAL METHOD BLD: NORMAL
EOSINOPHIL # BLD AUTO: 0.2 10E9/L (ref 0–0.7)
EOSINOPHIL NFR BLD AUTO: 3.2 %
ERYTHROCYTE [DISTWIDTH] IN BLOOD BY AUTOMATED COUNT: 13.9 % (ref 10–15)
ERYTHROCYTE [SEDIMENTATION RATE] IN BLOOD BY WESTERGREN METHOD: 34 MM/H (ref 0–30)
GFR SERPL CREATININE-BSD FRML MDRD: 86 ML/MIN/{1.73_M2}
HCT VFR BLD AUTO: 43.1 % (ref 35–47)
HGB BLD-MCNC: 13.7 G/DL (ref 11.7–15.7)
LYMPHOCYTES # BLD AUTO: 2 10E9/L (ref 0.8–5.3)
LYMPHOCYTES NFR BLD AUTO: 27.3 %
MCH RBC QN AUTO: 29.5 PG (ref 26.5–33)
MCHC RBC AUTO-ENTMCNC: 31.8 G/DL (ref 31.5–36.5)
MCV RBC AUTO: 93 FL (ref 78–100)
MONOCYTES # BLD AUTO: 0.7 10E9/L (ref 0–1.3)
MONOCYTES NFR BLD AUTO: 9.6 %
NEUTROPHILS # BLD AUTO: 4.3 10E9/L (ref 1.6–8.3)
NEUTROPHILS NFR BLD AUTO: 59.5 %
PLATELET # BLD AUTO: 222 10E9/L (ref 150–450)
PROT SERPL-MCNC: 7.7 G/DL (ref 6.8–8.8)
RBC # BLD AUTO: 4.65 10E12/L (ref 3.8–5.2)
WBC # BLD AUTO: 7.2 10E9/L (ref 4–11)

## 2021-02-10 PROCEDURE — 86140 C-REACTIVE PROTEIN: CPT | Performed by: INTERNAL MEDICINE

## 2021-02-10 PROCEDURE — 80076 HEPATIC FUNCTION PANEL: CPT | Performed by: INTERNAL MEDICINE

## 2021-02-10 PROCEDURE — 36415 COLL VENOUS BLD VENIPUNCTURE: CPT | Performed by: INTERNAL MEDICINE

## 2021-02-10 PROCEDURE — 85025 COMPLETE CBC W/AUTO DIFF WBC: CPT | Performed by: INTERNAL MEDICINE

## 2021-02-10 PROCEDURE — 85652 RBC SED RATE AUTOMATED: CPT | Performed by: INTERNAL MEDICINE

## 2021-02-10 PROCEDURE — 82565 ASSAY OF CREATININE: CPT | Performed by: INTERNAL MEDICINE

## 2021-02-10 NOTE — LETTER
February 15, 2021      Margakayla Clark  [address] [facility] [address]        Dear ,    We are writing to inform you of your test results.      Rheumatology labs show elevated inflammatory markers ESR and CRP, as we discussed at your clinic visit.  Other labs are okay.     Please let me know if you have any questions.         Resulted Orders   Hepatic panel   Result Value Ref Range    Bilirubin Direct 0.2 0.0 - 0.2 mg/dL    Bilirubin Total 0.9 0.2 - 1.3 mg/dL    Albumin 3.8 3.4 - 5.0 g/dL    Protein Total 7.7 6.8 - 8.8 g/dL    Alkaline Phosphatase 101 40 - 150 U/L    ALT 19 0 - 50 U/L    AST 33 0 - 45 U/L   CRP inflammation   Result Value Ref Range    CRP Inflammation 11.8 (H) 0.0 - 8.0 mg/L   Erythrocyte sedimentation rate auto   Result Value Ref Range    Sed Rate 34 (H) 0 - 30 mm/h   Creatinine   Result Value Ref Range    Creatinine 0.67 0.52 - 1.04 mg/dL    GFR Estimate 86 >60 mL/min/[1.73_m2]      Comment:      Non  GFR Calc  Starting 12/18/2018, serum creatinine based estimated GFR (eGFR) will be   calculated using the Chronic Kidney Disease Epidemiology Collaboration   (CKD-EPI) equation.      GFR Estimate If Black >90 >60 mL/min/[1.73_m2]      Comment:       GFR Calc  Starting 12/18/2018, serum creatinine based estimated GFR (eGFR) will be   calculated using the Chronic Kidney Disease Epidemiology Collaboration   (CKD-EPI) equation.     CBC with platelets differential   Result Value Ref Range    WBC 7.2 4.0 - 11.0 10e9/L    RBC Count 4.65 3.8 - 5.2 10e12/L    Hemoglobin 13.7 11.7 - 15.7 g/dL    Hematocrit 43.1 35.0 - 47.0 %    MCV 93 78 - 100 fl    MCH 29.5 26.5 - 33.0 pg    MCHC 31.8 31.5 - 36.5 g/dL    RDW 13.9 10.0 - 15.0 %    Platelet Count 222 150 - 450 10e9/L    % Neutrophils 59.5 %    % Lymphocytes 27.3 %    % Monocytes 9.6 %    % Eosinophils 3.2 %    % Basophils 0.4 %    Absolute Neutrophil 4.3 1.6 - 8.3 10e9/L    Absolute Lymphocytes 2.0 0.8 -  5.3 10e9/L    Absolute Monocytes 0.7 0.0 - 1.3 10e9/L    Absolute Eosinophils 0.2 0.0 - 0.7 10e9/L    Absolute Basophils 0.0 0.0 - 0.2 10e9/L    Diff Method Automated Method        If you have any questions or concerns, please call the clinic at the number listed above.       Sincerely,      Kody Urias MD

## 2021-02-15 ENCOUNTER — VIRTUAL VISIT (OUTPATIENT)
Dept: RHEUMATOLOGY | Facility: CLINIC | Age: 76
End: 2021-02-15
Payer: COMMERCIAL

## 2021-02-15 DIAGNOSIS — Z91.030 HISTORY OF BEE STING ALLERGY: Primary | ICD-10-CM

## 2021-02-15 DIAGNOSIS — M06.09 RHEUMATOID ARTHRITIS OF MULTIPLE SITES WITH NEGATIVE RHEUMATOID FACTOR (H): ICD-10-CM

## 2021-02-15 PROCEDURE — 99214 OFFICE O/P EST MOD 30 MIN: CPT | Mod: 95 | Performed by: INTERNAL MEDICINE

## 2021-02-15 RX ORDER — EPINEPHRINE 0.3 MG/.3ML
0.3 INJECTION SUBCUTANEOUS PRN
Qty: 0.6 ML | Refills: 1 | Status: SHIPPED | OUTPATIENT
Start: 2021-02-15

## 2021-02-15 RX ORDER — HYDROXYCHLOROQUINE SULFATE 200 MG/1
TABLET, FILM COATED ORAL
Qty: 135 TABLET | Refills: 1 | Status: SHIPPED | OUTPATIENT
Start: 2021-02-15 | End: 2021-01-01

## 2021-02-15 NOTE — PROGRESS NOTES
"Marga is a 75 year old who is being evaluated via a billable telephone visit.      What phone number would you like to be contacted at? [phone]  How would you like to obtain your AVS? Mail a copy    Rheumatology Telephone/Telehealth Visit      Marga Clark MRN# [id number]   YOB: 1945 Age: 75 year old      Date of visit: 2/15/21   PCP: Dr. Tiarra Mak    Chief Complaint   Patient presents with:  RECHECK: RA    Assessment and Plan     1. Rheumatoid arthritis (RF negative [[facility]], CCP low positive): Dx'd 2011.  Has been followed by Prince Ritter ([facility]) and Frances ([facility]).  Established with me on 10/30/2017. Previously on MTX (effective), prednisone (effective).  MTX and prednisone were stopped when she was doing well; inflammatory markers elevated so HCQ was started (per record review and patient interview).  Previously still with inflammatory joint symptoms so methotrexate addition versus hydroxychloroquine dose increase was discussed and ultimately hydroxychloroquine was increased.  Doing better at this time, with less than 5 minutes of morning stiffness and not dropping items as frequently but she is still dropping items every now and then.  Inflammatory marker ESR is normal for age but CRP remains elevated.  Suspect that she may benefit from adding methotrexate but she does not want to do so at this time.  Follow-up in clinic in 3 months.  Chronic illness, progressive.    - Continue hydroxychloroquine 200mg daily with an additional 200mg every other day (last eye exam was on 10/6/2020 with Dr. Oviedo: \"Normal Plaquenil eye tests today\")    2. R>L trochanteric bursitis, history: improved in the past with regular stretching exercises. Not an issue today.    3.  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  She " tells me that she only stopped getting EpiPen's because of the cost and because she never had to use it.  She wanted to know if she should have an EpiPen on hand and I recommended that she does due to her previously reported anaphylactic reaction.  She verbalized understanding about how to administer the EpiPen if needed and she would like a prescription for it.  Advised that she follow-up with her PCP for long-term management of this.  - EPINEPHrine (ANY BX GENERIC EQUIV) 0.3 MG/0.3ML injection 2-pack; Inject 0.3 mLs (0.3 mg) into the muscle as needed for anaphylaxis (hx of anaphylaxis to bee sting)  Dispense: 0.6 mL; Refill: 1    # At this time the COVID-19 vaccine (currently available from [employer] and [employer]) is recommended based on our knowledge of this vaccine and vaccines in the past.  Based on the data for the mRNA COVID-19 vaccines available in the [place], there is no preference for one COVID-19 vaccine over another. Note that there is no data currently available to specifically establish safety and efficacy for this vaccine in immunocompromised people.    #  Instructed that if confirmed to have COVID-19 or exposure to someone with confirmed COVID-19 to call this clinic for directions on DMARD management.    # This is a virtual visit to reduce the risk of COVID-19 exposure during this current pandemic.      # Considered to be at high risk of complications from the COVID-19 virus.  It is recommended to limit contact with other people and if possible to work remotely or provide a leave of absence to reduce the risk for COVID-19.      Total minutes spent in evaluation with patient, documentation, , and review of pertinent studies and chart notes: 24     Ms. Clark verbalized agreement with and understanding of the rational for the diagnosis and treatment plan.  All questions were answered to best of my ability and the patient's satisfaction. Ms. Clark was advised to contact the clinic with any  questions that may arise after the clinic visit.     Thank you for involving me in the care of the patient    Return to clinic: 3 months      HPI   Marga Clark is a 75 year old female with a past medical history significant for inflammatory arthritis who presents for follow-up of inflammatory arthritis.    Today, 2/15/2021: she reports that she is doing okay.  Pain and stiffness in the hands occurs in the morning, but not every morning.  No joint swelling. Morning stiffness for about <5 min.  Not dropping items as frequently anymore.    She also asked if she can get a prescription for an EpiPen.  She reports that she had an anaphylactic reaction to a bee sting in the past, requiring an ED evaluation due to throat swelling and difficulty breathing.  She has not had a bee sting since.  She never had to use her EpiPen and she did not want to keep paying for it so she stopped getting them from the pharmacy.  However, she would like to have an EpiPen on hand just in case.    Denies fevers, chills, nausea, vomiting, constipation, diarrhea. No abdominal pain. No chest pain/pressure, palpitations, or shortness of breath. No LE swelling. No neck pain. No oral or nasal sores.  No rash. No sicca symptoms.     Tobacco: 0.5 ppd  EtOH: Occasional  Drugs: None    ROS   GEN: No fevers, chills, or night sweats  SKIN: No itching, rashes, sores  HEENT: No oral or nasal ulcers.  CV: No chest pain, pressure, palpitations, or dyspnea on exertion.  PULM: No SOB, wheeze, cough.  GI: No nausea, vomiting, constipation, diarrhea. No blood in stool. No abdominal pain.  MSK: See HPI.  NEURO: No numbness, tingling, or weakness.  EXT: No LE swelling  PSYCH: Negative    Active Problem List     Patient Active Problem List   Diagnosis     CARDIOVASCULAR SCREENING; LDL GOAL LESS THAN 130     Advanced directives, counseling/discussion     High risk medications (not anticoagulants) long-term use     Inflammatory arthritis     Steroid long-term use      Rheumatoid arthritis of multiple sites with negative rheumatoid factor (H)     Adenomatous polyp of colon, unspecified part of colon     Tobacco abuse     Past Medical History     Past Medical History:   Diagnosis Date     Glaucoma (increased eye pressure)      Inflammatory arthritis 10/31/2011     Past Surgical History   History reviewed. No pertinent surgical history.  Allergy   No Known Allergies  Current Medication List     Current Outpatient Medications   Medication Sig     calcipotriene-betameth diprop (TACLONEX) 0.005-0.064 % external ointment Apply daily to affected area til clear     dorzolamide-timolol (COSOPT) 2-0.5 % ophthalmic solution Place 1 drop into the right eye 2 times daily     hydroxychloroquine (PLAQUENIL) 200 MG tablet Hydroxychloroquine 200 mg daily, and an additional 200 mg every other day     latanoprost (XALATAN) 0.005 % ophthalmic solution Place 1 drop into both eyes At Bedtime     triamcinolone (ARISTOCORT HP) 0.5 % external cream Apply topically 2 times daily     triamcinolone (KENALOG) 0.5 % cream Apply to rash on feet bid til clear     No current facility-administered medications for this visit.          Social History   See HPI    Family History     Family History   Problem Relation Age of Onset     Cerebrovascular Disease Mother      Glaucoma Mother      Heart Disease Father      Diabetes Maternal Grandfather      Diabetes Paternal Grandfather      Macular Degeneration No family hx of      Physical Exam     Temp Readings from Last 3 Encounters:   10/14/19 98.6  F (37  C) (Oral)   05/15/19 98.8  F (37.1  C) (Oral)   12/03/18 97.8  F (36.6  C) (Oral)     BP Readings from Last 5 Encounters:   12/02/19 134/64   10/14/19 132/64   06/03/19 148/73   05/15/19 137/67   12/03/18 148/79     Pulse Readings from Last 1 Encounters:   12/02/19 86     Resp Readings from Last 1 Encounters:   10/22/12 10     Estimated body mass index is 25.15 kg/m  as calculated from the following:    Height as  "of 12/2/19: 1.638 m (5' 4.5\").    Weight as of 12/2/19: 67.5 kg (148 lb 12.8 oz).      Patient reported weight on 2/15/2021: 152 pounds    GEN: alert and no distress  PSYCH: Alert; coherent speech, normal rate and volume, able to articulate logical thoughts, able   to abstract reason, no tangential thoughts. Normal affect.   RESP: No cough, no audible wheezing, able to talk in full sentences  Remainder of exam unable to be completed due to telephone visits      Labs / Imaging (select studies)     CBC  Recent Labs   Lab Test 02/10/21  0907 11/02/20 0927 05/04/20  0906   WBC 7.2 7.3 7.2   RBC 4.65 4.62 4.88   HGB 13.7 13.7 14.7   HCT 43.1 42.4 45.0   MCV 93 92 92   RDW 13.9 13.3 13.5    234 212   MCH 29.5 29.7 30.1   MCHC 31.8 32.3 32.7   NEUTROPHIL 59.5 59.1 63.7   LYMPH 27.3 28.6 25.0   MONOCYTE 9.6 8.9 8.5   EOSINOPHIL 3.2 2.9 2.2   BASOPHIL 0.4 0.5 0.6   ANEU 4.3 4.3 4.6   ALYM 2.0 2.1 1.8   MORENITA 0.7 0.7 0.6   AEOS 0.2 0.2 0.2   ABAS 0.0 0.0 0.0     CMP  Recent Labs   Lab Test 02/10/21  0907 11/02/20  0927 05/04/20  0906 07/14/17  0806 07/14/17  0806 10/30/13  1003   NA  --   --   --   --   --  140   POTASSIUM  --   --   --   --   --  3.9   CHLORIDE  --   --   --   --   --  104   CO2  --   --   --   --   --  27   ANIONGAP  --   --   --   --   --  9   GLC  --   --   --   --  80 83   BUN  --   --   --   --   --  12   CR 0.67 0.60 0.73   < >  --  0.62   GFRESTIMATED 86 90 82   < >  --  >90   GFRESTBLACK >90 >90 >90   < >  --  >90   JOANNE  --   --   --   --   --  9.1   BILITOTAL 0.9 0.9 0.8   < >  --  0.8   ALBUMIN 3.8 3.5 4.0   < >  --  3.9   PROTTOTAL 7.7 7.4 8.0   < >  --  7.4   ALKPHOS 101 105 99   < >  --  88   AST 33 38 44   < >  --  43   ALT 19 23 19   < >  --  29    < > = values in this interval not displayed.     Calcium/VitaminD  Recent Labs   Lab Test 10/30/13  1003   JOANNE 9.1     ESR/CRP  Recent Labs   Lab Test 02/10/21  0907 11/02/20  0927 05/04/20  0906   SED 34* 37* 23   CRP 11.8* 12.7* 3.1 "     Hepatitis B  Recent Labs   Lab Test 01/22/18  0827   HBCAB Nonreactive   HEPBANG Nonreactive     Immunization History     Immunization History   Administered Date(s) Administered     Pneumo Conj 13-V (2010&after) 10/30/2017     Pneumococcal 23 valent 10/11/2011     TDAP Vaccine (Adacel) 01/10/2007, 07/05/2017     Zoster vaccine, live 07/28/2009          Chart documentation done in part with Dragon Voice recognition Software. Although reviewed after completion, some word and grammatical error may remain.      Phone call duration with patient (in minutes): 16    Location of patient: [place], [place]   Location of provider: [place]    Kody Urias MD

## 2021-02-15 NOTE — RESULT ENCOUNTER NOTE
"Please mail Ms. Clark her results with the following message.    \"Ms. Amber,    Rheumatology labs show elevated inflammatory markers ESR and CRP, as we discussed at your clinic visit.  Other labs are okay.    Please let me know if you have any questions.    Sincerely,  Kody Urias MD  2/15/2021 9:44 AM\"  "

## 2021-03-17 ENCOUNTER — IMMUNIZATION (OUTPATIENT)
Dept: NURSING | Facility: CLINIC | Age: 76
End: 2021-03-17
Payer: COMMERCIAL

## 2021-03-17 PROCEDURE — 0001A PR COVID VAC PFIZER DIL RECON 30 MCG/0.3 ML IM: CPT

## 2021-03-17 PROCEDURE — 91300 PR COVID VAC PFIZER DIL RECON 30 MCG/0.3 ML IM: CPT

## 2021-04-06 ENCOUNTER — OFFICE VISIT (OUTPATIENT)
Dept: OPHTHALMOLOGY | Facility: CLINIC | Age: 76
End: 2021-04-06
Payer: COMMERCIAL

## 2021-04-06 DIAGNOSIS — M19.90 INFLAMMATORY ARTHRITIS: ICD-10-CM

## 2021-04-06 DIAGNOSIS — Z79.899 HIGH RISK MEDICATIONS (NOT ANTICOAGULANTS) LONG-TERM USE: ICD-10-CM

## 2021-04-06 DIAGNOSIS — H40.1121 PRIMARY OPEN ANGLE GLAUCOMA OF LEFT EYE, MILD STAGE: Primary | ICD-10-CM

## 2021-04-06 PROCEDURE — 92012 INTRM OPH EXAM EST PATIENT: CPT | Performed by: STUDENT IN AN ORGANIZED HEALTH CARE EDUCATION/TRAINING PROGRAM

## 2021-04-06 ASSESSMENT — VISUAL ACUITY
OS_CC: 20/20-1
METHOD: SNELLEN - LINEAR
CORRECTION_TYPE: GLASSES
OD_CC: 20/20-1

## 2021-04-06 ASSESSMENT — EXTERNAL EXAM - LEFT EYE: OS_EXAM: NORMAL

## 2021-04-06 ASSESSMENT — TONOMETRY
OS_IOP_MMHG: 20
OD_IOP_MMHG: 17
IOP_METHOD: APPLANATION

## 2021-04-06 ASSESSMENT — SLIT LAMP EXAM - LIDS: COMMENTS: NORMAL, MGD

## 2021-04-06 ASSESSMENT — EXTERNAL EXAM - RIGHT EYE: OD_EXAM: NORMAL

## 2021-04-06 ASSESSMENT — CUP TO DISC RATIO
OS_RATIO: 0.85
OD_RATIO: 0.8

## 2021-04-06 NOTE — PROGRESS NOTES
Current Eye Medications: latanoprost nightly both eyes, and cosopt twice a day , last drop at 9:15pm last night, pt now out of latanoprost. (Tech told pt she has 11 refills and she should  some more drops at her pharmacy).     Subjective:  6 mo iop recheck  Pt reports that she sees well and her eyes feel fine.     Objective:  See Ophthalmology Exam.       Assessment:  Marga Clark is a 75 year old female who presents with:   Encounter Diagnoses   Name Primary?     Primary open angle glaucoma of left eye, mild stage Intraocular pressure 17/20 today (out of latanoprost as of today). Again discussed purpose and importance of drops to help lower her eye pressure. And reiterated that Selected laser trabeculoplasty (SLT) is a good option as well. Will recheck pressure in 3 months.     High risk medications (not anticoagulants) long-term use      Inflammatory arthritis        Plan:  Resume Latanoprost (green top) at bedtime both eyes     Continue Cosopt (dorzolamide-timolol -- dark blue top) twice a day in both eyes    If you are having trouble getting the eyedrops above in consistently, I recommend Selected laser trabeculoplasty (SLT) to help lower the eye pressure.   If you do not take the drops as recommended or have the other procedures done to help lower the eye pressure, you risk having permanent vision loss.    Nora Oviedo MD  [phone]

## 2021-04-06 NOTE — LETTER
4/6/2021         RE: Marga Clark  [address] [facility] [address]        Dear Colleague,    Thank you for referring your patient, Marga Clark, to the [facility]. Please see a copy of my visit note below.     Current Eye Medications: latanoprost nightly both eyes, and cosopt twice a day , last drop at 9:15pm last night, pt now out of latanoprost. (Tech told pt she has 11 refills and she should  some more drops at her pharmacy).     Subjective:  6 mo iop recheck  Pt reports that she sees well and her eyes feel fine.     Objective:  See Ophthalmology Exam.       Assessment:  Marga Clark is a 75 year old female who presents with:   Encounter Diagnoses   Name Primary?     Primary open angle glaucoma of left eye, mild stage Intraocular pressure 17/20 today (out of latanoprost as of today). Again discussed purpose and importance of drops to help lower her eye pressure. And reiterated that Selected laser trabeculoplasty (SLT) is a good option as well. Will recheck pressure in 3 months.     High risk medications (not anticoagulants) long-term use      Inflammatory arthritis        Plan:  Resume Latanoprost (green top) at bedtime both eyes     Continue Cosopt (dorzolamide-timolol -- dark blue top) twice a day in both eyes    If you are having trouble getting the eyedrops above in consistently, I recommend Selected laser trabeculoplasty (SLT) to help lower the eye pressure.   If you do not take the drops as recommended or have the other procedures done to help lower the eye pressure, you risk having permanent vision loss.    Nora Oviedo MD  [phone]          Again, thank you for allowing me to participate in the care of your patient.        Sincerely,        Nora Oviedo MD

## 2021-04-06 NOTE — PATIENT INSTRUCTIONS
Continue Latanoprost (green top) at bedtime both eyes     Resume Cosopt (dorzolamide-timolol -- dark blue top) twice a day in both eyes    If you are having trouble getting the eyedrops above in consistently, I recommend Selected laser trabeculoplasty (SLT) to help lower the eye pressure.   If you do not take the drops as recommended or have the other procedures done to help lower the eye pressure, you risk having permanent vision loss.    Nora Oviedo MD  [phone]

## 2021-04-07 ENCOUNTER — IMMUNIZATION (OUTPATIENT)
Dept: NURSING | Facility: CLINIC | Age: 76
End: 2021-04-07
Attending: FAMILY MEDICINE
Payer: COMMERCIAL

## 2021-04-07 PROCEDURE — 91300 PR COVID VAC PFIZER DIL RECON 30 MCG/0.3 ML IM: CPT

## 2021-04-07 PROCEDURE — 0002A PR COVID VAC PFIZER DIL RECON 30 MCG/0.3 ML IM: CPT

## 2021-05-19 NOTE — TELEPHONE ENCOUNTER
Reason for Call:  Other call back    Detailed comments: Patient is requesting labs on her chart to schedule lab appt before her 9/15 appt.  Please call once placed on chart so she can schedule    Phone Number Patient can be reached at: Home number on file [phone] (home)    Best Time: any     Can we leave a detailed message on this number? YES    Call taken on 5/19/2021 at 10:30 AM by Maricarmen Robertson

## 2021-05-19 NOTE — TELEPHONE ENCOUNTER
RN: Please call to notify Marga Clark that rheumatology labs are not needed prior to next rheumatology visit      Kody Urias MD  5/19/2021 4:24 PM

## 2021-05-20 NOTE — TELEPHONE ENCOUNTER
Called patient and left a detailed message informing her that labs are not needed prior to her next visit.    Lencho WEST RN....5/20/2021 9:08 AM

## 2021-07-13 NOTE — LETTER
7/13/2021         RE: Marga Clark  [address] [facility] [address]        Dear Colleague,    Thank you for referring your patient, Marga Clark, to the [facility]. Please see a copy of my visit note below.     Current Eye Medications:  Dorz/timolol twice a day right eye, last took 8 am. Latanoprost at bedtime both eyes, last took at 9:30 pm     Subjective:  3 month follow up for IOP check. Vision is fine both eyes distance and near. No eye pain or discomfort in either eye.      Objective:  See Ophthalmology Exam.      Assessment:  Marga Clark is a 75 year old female who presents with:   Encounter Diagnoses   Name Primary?     Primary open angle glaucoma of left eye, mild stage Intraocular pressure 12/15 on drops. Sounds like better compliance with drops lately.      High risk medications (not anticoagulants) long-term use      Inflammatory arthritis        Plan:  Continue Latanoprost (green top) at bedtime both eyes     Continue Cosopt (dorzolamide-timolol -- dark blue top) twice a day both eyes    Nora Oviedo MD  [phone]          Again, thank you for allowing me to participate in the care of your patient.        Sincerely,        Nora Oviedo MD

## 2021-07-13 NOTE — PATIENT INSTRUCTIONS
Continue Latanoprost (green top) at bedtime both eyes     Continue Cosopt (dorzolamide-timolol -- dark blue top) twice a day both eyes    Nora Oviedo MD  [phone]

## 2021-07-13 NOTE — PROGRESS NOTES
Current Eye Medications:  Dorz/timolol twice a day right eye, last took 8 am. Latanoprost at bedtime both eyes, last took at 9:30 pm     Subjective:  3 month follow up for IOP check. Vision is fine both eyes distance and near. No eye pain or discomfort in either eye.      Objective:  See Ophthalmology Exam.      Assessment:  Marga Clark is a 75 year old female who presents with:   Encounter Diagnoses   Name Primary?     Primary open angle glaucoma of left eye, mild stage Intraocular pressure 12/15 on drops. Sounds like better compliance with drops lately.      High risk medications (not anticoagulants) long-term use      Inflammatory arthritis        Plan:  Continue Latanoprost (green top) at bedtime both eyes     Continue Cosopt (dorzolamide-timolol -- dark blue top) twice a day both eyes    Nora Oviedo MD  [phone]

## 2021-09-15 NOTE — NURSING NOTE
Blood pressure rechecked after visit    160/84  Naomie Singh CMA Rheumatology  9/15/2021 8:32 AM                                RAPID3 (0-30) Cumulative Score  3.0          RAPID3 Weighted Score (divide #4 by 3 and that is the weighted score)  1.0

## 2021-09-15 NOTE — PROGRESS NOTES
Rheumatology Clinic  Visit      Marga Clark MRN# [id number]   YOB: 1945 Age: 75 year old      Date of visit: 9/15/21   PCP: Dr. Tiarra Mak    Chief Complaint   Patient presents with:  Rheumatoid Arthritis: Doing better now that it is cooler out    Assessment and Plan     1. Rheumatoid arthritis (RF negative [[facility]], CCP low positive): Dx'd 2011.  Has been followed by Prince Ritter ([place]) and Frances ([facility]).  Established with me on 10/30/2017. Previously on MTX (effective), prednisone (effective).  MTX and prednisone were stopped when she was doing well; inflammatory markers elevated so HCQ was started (per record review and patient interview).  Previously when having inflammatory joint symptoms HCQ was increased with resolution of symptoms. Doing well today.  Chronic illness, stable.     - Continue hydroxychloroquine 200mg daily with an additional 200mg every other day (last eye exam was on 10/6/2020 with Dr. Oviedo, advised yearly exam).    - Labs now: CBC, Creatinine, Hepatic Panel, ESR, CRP    2. R>L trochanteric bursitis, history: improved in the past with regular stretching exercises. Not an issue today.    - COVID-19: has received the Pfizer COVID-19 vaccine on 3/17/2021 and 4/7/2021    Total minutes spent in evaluation with patient, documentation, , and review of pertinent studies and chart notes: 17    Ms. Clark verbalized agreement with and understanding of the rational for the diagnosis and treatment plan.  All questions were answered to best of my ability and the patient's satisfaction. Ms. Clark was advised to contact the clinic with any questions that may arise after the clinic visit.     Thank you for involving me in the care of the patient    Return to clinic: 6 months      HPI   Marga Clark is a 75 year old female with a past medical history significant for inflammatory arthritis who presents for follow-up of inflammatory arthritis.    Today,  9/15/2021: doing well at this time. No joint pain or swelling. Morning stiffness for <5 min; no gelling. Not dropping items. Arthritis is not preventing her from doing any of her daily activities.  Tolerating Hydroxychloroquine well. Intentional weight loss.     Denies fevers, chills, nausea, vomiting, constipation, diarrhea. No abdominal pain. No chest pain/pressure, palpitations, or shortness of breath. No LE swelling. No neck pain. No oral or nasal sores.  No rash. No sicca symptoms.     Tobacco: 0.5 ppd  EtOH: Occasional  Drugs: None    ROS   12 point review of system was completed and negative except as noted in the HPI     Active Problem List     Patient Active Problem List   Diagnosis     CARDIOVASCULAR SCREENING; LDL GOAL LESS THAN 130     Advanced directives, counseling/discussion     High risk medications (not anticoagulants) long-term use     Inflammatory arthritis     Steroid long-term use     Rheumatoid arthritis of multiple sites with negative rheumatoid factor (H)     Adenomatous polyp of colon, unspecified part of colon     Tobacco abuse     Past Medical History     Past Medical History:   Diagnosis Date     Glaucoma (increased eye pressure)      Inflammatory arthritis 10/31/2011     Past Surgical History   History reviewed. No pertinent surgical history.  Allergy     Allergies   Allergen Reactions     Bees      Current Medication List     Current Outpatient Medications   Medication Sig     dorzolamide-timolol (COSOPT) 2-0.5 % ophthalmic solution Place 1 drop into the right eye 2 times daily     hydroxychloroquine (PLAQUENIL) 200 MG tablet Hydroxychloroquine 200 mg daily, and an additional 200 mg every other day     latanoprost (XALATAN) 0.005 % ophthalmic solution Place 1 drop into both eyes At Bedtime     triamcinolone (ARISTOCORT HP) 0.5 % external cream Apply topically 2 times daily     triamcinolone (KENALOG) 0.5 % cream Apply to rash on feet bid til clear     calcipotriene-betameth diprop  "(TACLONEX) 0.005-0.064 % external ointment Apply daily to affected area til clear     EPINEPHrine (ANY BX GENERIC EQUIV) 0.3 MG/0.3ML injection 2-pack Inject 0.3 mLs (0.3 mg) into the muscle as needed for anaphylaxis (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         Social History   See HPI    Family History     Family History   Problem Relation Age of Onset     Cerebrovascular Disease Mother      Glaucoma Mother      Heart Disease Father      Diabetes Maternal Grandfather      Diabetes Paternal Grandfather      Macular Degeneration No family hx of      Physical Exam     Temp Readings from Last 3 Encounters:   10/14/19 98.6  F (37  C) (Oral)   05/15/19 98.8  F (37.1  C) (Oral)   12/03/18 97.8  F (36.6  C) (Oral)     BP Readings from Last 5 Encounters:   09/15/21 (!) 177/88   12/02/19 134/64   10/14/19 132/64   06/03/19 148/73   05/15/19 137/67     Pulse Readings from Last 1 Encounters:   09/15/21 78     Resp Readings from Last 1 Encounters:   10/22/12 10     Estimated body mass index is 21.83 kg/m  as calculated from the following:    Height as of this encounter: 1.638 m (5' 4.5\").    Weight as of this encounter: 58.6 kg (129 lb 3.2 oz).    GEN: NAD. Healthy appearing adult.   HEENT:  Anicteric, noninjected sclera. No obvious external lesions of the ear and nose. Hearing intact.  CV: S1, S2. RRR. No m/r/g  PULM: No increased work of breathing. CTA bilaterally   MSK: MCPs, PIPs, DIPs without swelling or tenderness to palpation.  Wrists without swelling or tenderness to palpation.  Elbows and shoulders without swelling or tenderness to palpation.  Shoulders with normal range of motion.  Knees, ankles, and MTPs without swelling or tenderness to palpation.    SKIN: No rash or jaundice seen  PSYCH: Alert. Appropriate.     Labs / Imaging (select studies)     CBC  Recent Labs   Lab Test 02/10/21  0907 11/02/20  0927 " 05/04/20  0906   WBC 7.2 7.3 7.2   RBC 4.65 4.62 4.88   HGB 13.7 13.7 14.7   HCT 43.1 42.4 45.0   MCV 93 92 92   RDW 13.9 13.3 13.5    234 212   MCH 29.5 29.7 30.1   MCHC 31.8 32.3 32.7   NEUTROPHIL 59.5 59.1 63.7   LYMPH 27.3 28.6 25.0   MONOCYTE 9.6 8.9 8.5   EOSINOPHIL 3.2 2.9 2.2   BASOPHIL 0.4 0.5 0.6   ANEU 4.3 4.3 4.6   ALYM 2.0 2.1 1.8   MORENITA 0.7 0.7 0.6   AEOS 0.2 0.2 0.2   ABAS 0.0 0.0 0.0     CMP  Recent Labs   Lab Test 02/10/21  0907 11/02/20  0927 05/04/20  0906 01/22/18  0827 07/14/17  0806 10/30/13  1003   NA  --   --   --   --   --  140   POTASSIUM  --   --   --   --   --  3.9   CHLORIDE  --   --   --   --   --  104   CO2  --   --   --   --   --  27   ANIONGAP  --   --   --   --   --  9   GLC  --   --   --   --  80 83   BUN  --   --   --   --   --  12   CR 0.67 0.60 0.73   < >  --  0.62   GFRESTIMATED 86 90 82   < >  --  >90   GFRESTBLACK >90 >90 >90   < >  --  >90   JOANNE  --   --   --   --   --  9.1   BILITOTAL 0.9 0.9 0.8   < >  --  0.8   ALBUMIN 3.8 3.5 4.0   < >  --  3.9   PROTTOTAL 7.7 7.4 8.0   < >  --  7.4   ALKPHOS 101 105 99   < >  --  88   AST 33 38 44   < >  --  43   ALT 19 23 19   < >  --  29    < > = values in this interval not displayed.     Calcium/VitaminD  Recent Labs   Lab Test 10/30/13  1003   JOANNE 9.1     ESR/CRP  Recent Labs   Lab Test 02/10/21  0907 11/02/20  0927 05/04/20  0906   SED 34* 37* 23   CRP 11.8* 12.7* 3.1     Lipid Panel  Recent Labs   Lab Test 07/14/17  0806   CHOL 230*   TRIG 72   HDL 73   *   NHDL 157*     Hepatitis B  Recent Labs   Lab Test 01/22/18  0827   HBCAB Nonreactive   HEPBANG Nonreactive     Immunization History     Immunization History   Administered Date(s) Administered     COVID-19,PF,Pfizer 03/17/2021, 04/07/2021     Pneumo Conj 13-V (2010&after) 10/30/2017     Pneumococcal 23 valent 10/11/2011     TDAP Vaccine (Adacel) 01/10/2007, 07/05/2017     Zoster vaccine, live 07/28/2009          Chart documentation done in part with Dragon Voice  recognition Software. Although reviewed after completion, some word and grammatical error may remain.    Kody Urias MD

## 2021-09-15 NOTE — PATIENT INSTRUCTIONS
RHEUMATOLOGY    Dr. Kody Urias    [address] [place] [address]  Phone number: [phone]  Fax number: [phone]    Thank you for choosing [facility]!    Naomie Singh CMA Rheumatology

## 2021-10-15 NOTE — LETTER
10/15/2021         RE: Marga Clark  [address] [facility] [address]        Dear Colleague,    Thank you for referring your patient, Marga Clark, to the [facility]. Please see a copy of my visit note below.     Current Eye Medications:  Cosopt right eye twice a day, Latanoprost both eyes every evening.  Last drops:  Right eye:  8:15am, left eye:  9:30pm     Subjective:  Follow up Open Angle Glaucoma.  Patient is here or a pressure check, OCT, and visual field.  No vision changes or concerns.      Objective:  See Ophthalmology Exam.      Assessment:  Marga Clark is a 76 year old female who presents with:   Encounter Diagnoses   Name Primary?     Primary open angle glaucoma of left eye, mild stage Intraocular pressure 14/15 with stable OCT and visual field. Continue same medications.     OCT optic nerve: avg retinal nerve fiber layer 75/72; stable compared to 2020.     Gaitan visual field (HVF) 24-2: within normal limits (mild scattered loss right eye); inf/central loss left eye.        High risk medications (not anticoagulants) long-term use Plaquenil since 2014 (7 years now).     Macular SD-OCT: normal ellipsoid layer both eyes; mild Epiretinal membrane left eye.     Gaitan visual field (HVF) as above.    No signs of Plaquenil retinal toxicity at present.        Inflammatory arthritis        Plan:  Continue Latanoprost (green top) at bedtime both eyes     Continue Cosopt (dorzolamide-timolol -- dark blue top) twice a day both eyes    Normal Plaquenil eye tests today.    Nora Oviedo MD  [phone]               Again, thank you for allowing me to participate in the care of your patient.        Sincerely,        Nora Oviedo MD

## 2021-10-15 NOTE — PATIENT INSTRUCTIONS
Continue Latanoprost (green top) at bedtime both eyes     Continue Cosopt (dorzolamide-timolol -- dark blue top) twice a day both eyes    Normal Plaquenil eye tests today.    Nora Oviedo MD  [phone]

## 2021-10-15 NOTE — PROGRESS NOTES
Current Eye Medications:  Cosopt right eye twice a day, Latanoprost both eyes every evening.  Last drops:  Right eye:  8:15am, left eye:  9:30pm     Subjective:  Follow up Open Angle Glaucoma.  Patient is here or a pressure check, OCT, and visual field.  No vision changes or concerns.      Objective:  See Ophthalmology Exam.      Assessment:  Marga Clark is a 76 year old female who presents with:   Encounter Diagnoses   Name Primary?     Primary open angle glaucoma of left eye, mild stage Intraocular pressure 14/15 with stable OCT and visual field. Continue same medications.     OCT optic nerve: avg retinal nerve fiber layer 75/72; stable compared to 2020.     Gaitan visual field (HVF) 24-2: within normal limits (mild scattered loss right eye); inf/central loss left eye.        High risk medications (not anticoagulants) long-term use Plaquenil since 2014 (7 years now).     Macular SD-OCT: normal ellipsoid layer both eyes; mild Epiretinal membrane left eye.     Gaitan visual field (HVF) as above.    No signs of Plaquenil retinal toxicity at present.        Inflammatory arthritis        Plan:  Continue Latanoprost (green top) at bedtime both eyes     Continue Cosopt (dorzolamide-timolol -- dark blue top) twice a day both eyes    Normal Plaquenil eye tests today.    Nora Oviedo MD  [phone]

## 2021-12-13 NOTE — TELEPHONE ENCOUNTER
Refused Prescriptions:                       Disp   Refills    calcipotriene-betameth diprop (TACLONEX) 0*60 g   3        Sig: Apply daily to affected area til clear  Refused By: JORDANA BROWN  Reason for Refusal: Originating/Specialty Provider to approve    Jordana Brown RN

## 2022-01-01 ENCOUNTER — ANCILLARY PROCEDURE (OUTPATIENT)
Dept: MAMMOGRAPHY | Facility: CLINIC | Age: 77
End: 2022-01-01
Payer: COMMERCIAL

## 2022-01-01 ENCOUNTER — OFFICE VISIT (OUTPATIENT)
Dept: FAMILY MEDICINE | Facility: CLINIC | Age: 77
End: 2022-01-01
Payer: COMMERCIAL

## 2022-01-01 ENCOUNTER — OFFICE VISIT (OUTPATIENT)
Dept: RHEUMATOLOGY | Facility: CLINIC | Age: 77
End: 2022-01-01
Payer: COMMERCIAL

## 2022-01-01 ENCOUNTER — TELEPHONE (OUTPATIENT)
Dept: FAMILY MEDICINE | Facility: CLINIC | Age: 77
End: 2022-01-01
Payer: COMMERCIAL

## 2022-01-01 VITALS
DIASTOLIC BLOOD PRESSURE: 72 MMHG | OXYGEN SATURATION: 98 % | HEART RATE: 83 BPM | BODY MASS INDEX: 21.16 KG/M2 | HEIGHT: 65 IN | RESPIRATION RATE: 16 BRPM | WEIGHT: 127 LBS | SYSTOLIC BLOOD PRESSURE: 146 MMHG | TEMPERATURE: 97.8 F

## 2022-01-01 VITALS
WEIGHT: 126.6 LBS | HEART RATE: 86 BPM | SYSTOLIC BLOOD PRESSURE: 132 MMHG | BODY MASS INDEX: 21.09 KG/M2 | HEIGHT: 65 IN | DIASTOLIC BLOOD PRESSURE: 80 MMHG | OXYGEN SATURATION: 98 %

## 2022-01-01 DIAGNOSIS — Z13.1 SCREENING FOR DIABETES MELLITUS: ICD-10-CM

## 2022-01-01 DIAGNOSIS — Z12.31 ENCOUNTER FOR SCREENING MAMMOGRAM FOR BREAST CANCER: ICD-10-CM

## 2022-01-01 DIAGNOSIS — D12.6 ADENOMATOUS POLYP OF COLON, UNSPECIFIED PART OF COLON: ICD-10-CM

## 2022-01-01 DIAGNOSIS — Z13.220 SCREENING CHOLESTEROL LEVEL: ICD-10-CM

## 2022-01-01 DIAGNOSIS — R21 RASH: ICD-10-CM

## 2022-01-01 DIAGNOSIS — R21 RASH OF BOTH FEET: ICD-10-CM

## 2022-01-01 DIAGNOSIS — R23.8 VESICULAR RASH: ICD-10-CM

## 2022-01-01 DIAGNOSIS — Z00.00 ENCOUNTER FOR MEDICARE ANNUAL WELLNESS EXAM: Primary | ICD-10-CM

## 2022-01-01 DIAGNOSIS — M06.09 RHEUMATOID ARTHRITIS OF MULTIPLE SITES WITH NEGATIVE RHEUMATOID FACTOR (H): Primary | ICD-10-CM

## 2022-01-01 LAB
ANION GAP SERPL CALCULATED.3IONS-SCNC: 7 MMOL/L (ref 3–14)
BUN SERPL-MCNC: 8 MG/DL (ref 7–30)
CALCIUM SERPL-MCNC: 9.6 MG/DL (ref 8.5–10.1)
CHLORIDE BLD-SCNC: 106 MMOL/L (ref 94–109)
CHOLEST SERPL-MCNC: 223 MG/DL
CO2 SERPL-SCNC: 25 MMOL/L (ref 20–32)
CREAT SERPL-MCNC: 0.62 MG/DL (ref 0.52–1.04)
FASTING STATUS PATIENT QL REPORTED: YES
GFR SERPL CREATININE-BSD FRML MDRD: >90 ML/MIN/1.73M2
GLUCOSE BLD-MCNC: 92 MG/DL (ref 70–99)
HDLC SERPL-MCNC: 67 MG/DL
LDLC SERPL CALC-MCNC: 136 MG/DL
NONHDLC SERPL-MCNC: 156 MG/DL
POTASSIUM BLD-SCNC: 4.1 MMOL/L (ref 3.4–5.3)
SODIUM SERPL-SCNC: 138 MMOL/L (ref 133–144)
TRIGL SERPL-MCNC: 101 MG/DL

## 2022-01-01 PROCEDURE — 77067 SCR MAMMO BI INCL CAD: CPT | Mod: TC | Performed by: RADIOLOGY

## 2022-01-01 PROCEDURE — 99214 OFFICE O/P EST MOD 30 MIN: CPT | Performed by: INTERNAL MEDICINE

## 2022-01-01 PROCEDURE — 99397 PER PM REEVAL EST PAT 65+ YR: CPT | Performed by: FAMILY MEDICINE

## 2022-01-01 PROCEDURE — 80048 BASIC METABOLIC PNL TOTAL CA: CPT | Performed by: FAMILY MEDICINE

## 2022-01-01 PROCEDURE — 36415 COLL VENOUS BLD VENIPUNCTURE: CPT | Performed by: FAMILY MEDICINE

## 2022-01-01 PROCEDURE — 80061 LIPID PANEL: CPT | Performed by: FAMILY MEDICINE

## 2022-01-01 PROCEDURE — 77063 BREAST TOMOSYNTHESIS BI: CPT | Mod: TC | Performed by: RADIOLOGY

## 2022-01-01 PROCEDURE — 99213 OFFICE O/P EST LOW 20 MIN: CPT | Mod: 25 | Performed by: FAMILY MEDICINE

## 2022-01-01 RX ORDER — HYDROXYCHLOROQUINE SULFATE 200 MG/1
TABLET, FILM COATED ORAL
Qty: 135 TABLET | Refills: 2 | Status: SHIPPED | OUTPATIENT
Start: 2022-01-01

## 2022-01-01 RX ORDER — TRIAMCINOLONE ACETONIDE 5 MG/G
CREAM TOPICAL
Qty: 30 G | Refills: 1 | Status: SHIPPED | OUTPATIENT
Start: 2022-01-01

## 2022-01-01 RX ORDER — CALCIPOTRIENE, BETAMETHASONE DIPROPIONATE 50; .643 UG/G; MG/G
OINTMENT TOPICAL
Status: CANCELLED | OUTPATIENT
Start: 2022-01-01

## 2022-01-01 ASSESSMENT — ENCOUNTER SYMPTOMS
HEMATOCHEZIA: 0
CHILLS: 0
HEMATURIA: 0
ABDOMINAL PAIN: 0

## 2022-01-01 ASSESSMENT — ACTIVITIES OF DAILY LIVING (ADL): CURRENT_FUNCTION: NO ASSISTANCE NEEDED

## 2022-01-01 ASSESSMENT — PAIN SCALES - GENERAL: PAINLEVEL: NO PAIN (0)

## 2022-01-01 ASSESSMENT — MIFFLIN-ST. JEOR: SCORE: 1064.01

## 2022-01-05 NOTE — TELEPHONE ENCOUNTER
Patient called requesting refill for ointment for her foot, Taclonex ointment.   Informed patient last filled in 2019, also last office visit with Dr. Tiarra Alegria was 10/14/19  Instructed patient will need office visit for more refills    Rocio BLACKMONN, RN

## 2022-02-07 NOTE — PROGRESS NOTES
"  SUBJECTIVE:   Marga Clark is a 76 year old female who presents for Preventive Visit.    {Split Bill scripting  The purpose of this visit is to discuss your medical history and prevent health problems before you are sick. You may be responsible for a co-pay, coinsurance, or deductible if your visit today includes services such as checking on a sore throat, having an x-ray or lab test, or treating and evaluating a new or existing condition :213134}  {Patient advised of split billing (Optional):685436}  Are you in the first 12 months of your Medicare Part B coverage?  { :552010::\"No\"}    Physical Health:    In general, how would you rate your overall physical health? { :475106}    Outside of work, how many days during the week do you exercise? { :276798}    Outside of work, approximately how many minutes a day do you exercise?{ :012144}    If you drink alcohol do you typically have >3 drinks per day or >7 drinks per week? { :483290}    Do you usually eat at least 4 servings of fruit and vegetables a day, include whole grains & fiber and avoid regularly eating high fat or \"junk\" foods? { :408295::\"Yes\"}    Do you have any problems taking medications regularly?  { :083348::\"No\"}    Do you have any side effects from medications? { :406006}    Needs assistance for the following daily activities: { :517648}    Which of the following safety concerns are present in your home?  { :369798::\"none identified\"}     Hearing impairment: { :253169}    In the past 6 months, have you been bothered by leaking of urine? { :112166}    Mental Health:    In general, how would you rate your overall mental or emotional health? { :310951}  PHQ-2 Score:      Do you feel safe in your environment? { :112467}    Have you ever done Advance Care Planning? (For example, a Health Directive, POLST, or a discussion with a medical provider or your loved ones about your wishes): { :888888}    Additional concerns to address?  {If YES, notify " "patient they may be responsible for a copay, coinsurance or deductible if the visit today includes services such as checking on a sore throat, having an x-ray or lab test, or treating and evaluating a new or existing condition :649521::\"No\"}    Fall risk:  { :131787}  {If any of the above assessments are answered yes, consider ordering appropriate referrals (Optional):171216::\"click delete button to remove this line now\"}  Cognitive Screening: { :101324}    {Do you have sleep apnea, excessive snoring or daytime drowsiness? (Optional):627448}    {Outside tests to abstract? :180888}    {additional problems to add (Optional):980630}    Reviewed and updated as needed this visit by clinical staff                Reviewed and updated as needed this visit by Provider               Social History     Tobacco Use     Smoking status: Current Every Day Smoker     Packs/day: 0.50     Years: 40.00     Pack years: 20.00     Types: Cigarettes     Smokeless tobacco: Never Used   Substance Use Topics     Alcohol use: Yes     Comment: occas                           Current providers sharing in care for this patient include: {Rooming staff:  Please update Care Team in Rooming Activity, refresh this note and then delete this statement}  Patient Care Team:  Tiarra Alegria MD as PCP - General  Tiarra Alegria MD as Assigned PCP  Nora Oviedo MD as Assigned Surgical Provider  Kody Urias MD as Assigned Rheumatology Provider    The following health maintenance items are reviewed in Epic and correct as of today:  Health Maintenance   Topic Date Due     ANNUAL REVIEW OF HM ORDERS  Never done     LUNG CANCER SCREENING  Never done     ZOSTER IMMUNIZATION (2 of 3) 09/22/2009     MEDICARE ANNUAL WELLNESS VISIT  Never done     COLORECTAL CANCER SCREENING  03/27/2017     FALL RISK ASSESSMENT  05/15/2020     INFLUENZA VACCINE (1) Never done     MAMMO SCREENING  10/01/2021     PHQ-2  01/01/2022     LIPID  07/14/2022     " "DEXA  2024     ADVANCE CARE PLANNING  05/15/2024     DTAP/TDAP/TD IMMUNIZATION (3 - Td or Tdap) 2027     HEPATITIS C SCREENING  Completed     Pneumococcal Vaccine: 65+ Years  Completed     COVID-19 Vaccine  Completed     IPV IMMUNIZATION  Aged Out     MENINGITIS IMMUNIZATION  Aged Out     HEPATITIS B IMMUNIZATION  Aged Out     {Chronicprobdata (Optional):840319}  {Decision Support (Optional):047451}    ROS:  {ROS COMP:179198}    OBJECTIVE:   There were no vitals taken for this visit. Estimated body mass index is 21.83 kg/m  as calculated from the following:    Height as of 9/15/21: 1.638 m (5' 4.5\").    Weight as of 9/15/21: 58.6 kg (129 lb 3.2 oz).  EXAM:   {Exam :874668}    {Diagnostic Test Results (Optional):697183::\"Diagnostic Test Results:\",\"Labs reviewed in Epic\"}    ASSESSMENT / PLAN:   {Diag Picklist:928759}    {Patient advised of split billing (Optional):151384}    COUNSELING:  {Medicare Counselin}    Estimated body mass index is 21.83 kg/m  as calculated from the following:    Height as of 9/15/21: 1.638 m (5' 4.5\").    Weight as of 9/15/21: 58.6 kg (129 lb 3.2 oz).    {Weight Management Plan (ACO) Complete if BMI is abnormal-  Ages 18-64  BMI >24.9.  Age 65+ with BMI <23 or >30 (Optional):025669}    She reports that she has been smoking cigarettes. She has a 20.00 pack-year smoking history. She has never used smokeless tobacco.  Tobacco Cessation Action Plan:   {TOBACCO CESSATION ACTION PLAN:814111}    Appropriate preventive services were discussed with this patient, including applicable screening as appropriate for cardiovascular disease, diabetes, osteopenia/osteoporosis, and glaucoma.  As appropriate for age/gender, discussed screening for colorectal cancer, prostate cancer, breast cancer, and cervical cancer. Checklist reviewing preventive services available has been given to the patient.    Reviewed patients plan of care and provided an AVS. The {CarePlan:953359} for Marga " meets the Care Plan requirement. This Care Plan has been established and reviewed with the {PATIENT, FAMILY MEMBER, CAREGIVER:828465}.    Counseling Resources:  ATP IV Guidelines  Pooled Cohorts Equation Calculator  Breast Cancer Risk Calculator  BRCA-Related Cancer Risk Assessment: FHS-7 Tool  FRAX Risk Assessment  ICSI Preventive Guidelines  Dietary Guidelines for Americans, 2010  USDA's MyPlate  ASA Prophylaxis  Lung CA Screening    Tiarra Alegria MD  [facility]

## 2022-02-07 NOTE — PATIENT INSTRUCTIONS
Patient Education   Personalized Prevention Plan  You are due for the preventive services outlined below.  Your care team is available to assist you in scheduling these services.  If you have already completed any of these items, please share that information with your care team to update in your medical record.  Health Maintenance Due   Topic Date Due     ANNUAL REVIEW OF HM ORDERS  Never done     LUNG CANCER SCREENING  Never done     Zoster (Shingles) Vaccine (2 of 3) 09/22/2009     Annual Wellness Visit  Never done     Colorectal Cancer Screening  03/27/2017     FALL RISK ASSESSMENT  05/15/2020     Flu Vaccine (1) Never done     Mammogram  10/01/2021     PHQ-2  01/01/2022

## 2022-02-10 NOTE — PROGRESS NOTES
"SUBJECTIVE:   Marga Clark is a 76 year old female who presents for Preventive Visit.      Patient has been advised of split billing requirements and indicates understanding: Yes  Are you in the first 12 months of your Medicare coverage?  No    Healthy Habits:     In general, how would you rate your overall health?  Excellent    Frequency of exercise:  2-3 days/week    Duration of exercise:  Less than 15 minutes    Do you usually eat at least 4 servings of fruit and vegetables a day, include whole grains    & fiber and avoid regularly eating high fat or \"junk\" foods?  Yes    Taking medications regularly:  Yes    Medication side effects:  None    Ability to successfully perform activities of daily living:  No assistance needed    Home Safety:  No safety concerns identified    Hearing Impairment:  No hearing concerns    In the past 6 months, have you been bothered by leaking of urine?  No    In general, how would you rate your overall mental or emotional health?  Excellent      PHQ-2 Total Score: 0    Additional concerns today:  No    Do you feel safe in your environment? Yes    Have you ever done Advance Care Planning? (For example, a Health Directive, POLST, or a discussion with a medical provider or your loved ones about your wishes): Yes, patient states has an Advance Care Planning document and will bring a copy to the clinic.       Fall risk       Cognitive Screening   1) Repeat 3 items (Leader, Season, Table)    2) Clock draw: NORMAL  3) 3 item recall: Recalls 3 objects  Results: 3 items recalled: COGNITIVE IMPAIRMENT LESS LIKELY    Mini-CogTM Copyright MERON Al. Licensed by the author for use in [facility]; reprinted with permission (jo@.[facility]). All rights reserved.      Do you have sleep apnea, excessive snoring or daytime drowsiness?: no    Reviewed and updated as needed this visit by clinical staff  Tobacco  Allergies  Meds   Med Hx  Surg Hx  Fam Hx  Soc Hx       Reviewed and " updated as needed this visit by Provider               Social History     Tobacco Use     Smoking status: Current Every Day Smoker     Packs/day: 0.50     Years: 40.00     Pack years: 20.00     Types: Cigarettes     Smokeless tobacco: Never Used   Substance Use Topics     Alcohol use: Yes     Comment: occas         Alcohol Use 2/10/2022   Prescreen: >3 drinks/day or >7 drinks/week? No     Pt with intermittent rash on feet. Flares up intermittently.   Needs refill of steroid cream as it has been a few years since rx filled and it is flare up  It becomes itchy and then painful            Current providers sharing in care for this patient include:   Patient Care Team:  Tiarra Alegria MD as PCP - General  Tiarra Alegria MD as Assigned PCP  Nora Oviedo MD as Assigned Surgical Provider  Kody Urias MD as Assigned Rheumatology Provider    The following health maintenance items are reviewed in Epic and correct as of today:  Health Maintenance Due   Topic Date Due     ANNUAL REVIEW OF  ORDERS  Never done     LUNG CANCER SCREENING  Never done     ZOSTER IMMUNIZATION (2 of 3) 09/22/2009     MEDICARE ANNUAL WELLNESS VISIT  Never done     COLORECTAL CANCER SCREENING  03/27/2017     FALL RISK ASSESSMENT  05/15/2020     INFLUENZA VACCINE (1) Never done     MAMMO SCREENING  10/01/2021     Lab work is in process  Mammogram Screening: Mammogram Screening - Patient over age 75, has elected to continue with screening.      Pertinent mammograms are reviewed under the imaging tab.    Review of Systems   Constitutional: Negative for chills.   HENT: Negative for congestion.    Cardiovascular: Negative for chest pain.   Gastrointestinal: Negative for abdominal pain and hematochezia.   Genitourinary: Negative for hematuria.     Constitutional, HEENT, cardiovascular, pulmonary, gi and gu systems are negative, except as otherwise noted.    OBJECTIVE:   BP (!) 180/78   Pulse 83   Temp 97.8  F (36.6  C) (Oral)   " Resp 16   Ht 1.638 m (5' 4.5\")   Wt 57.6 kg (127 lb)   SpO2 98%   BMI 21.46 kg/m   Estimated body mass index is 21.46 kg/m  as calculated from the following:    Height as of this encounter: 1.638 m (5' 4.5\").    Weight as of this encounter: 57.6 kg (127 lb).  Physical Exam  GENERAL: healthy, alert and no distress  EYES: Eyes grossly normal to inspection, PERRL and conjunctivae and sclerae normal  HENT: ear canals and TM's normal, nose and mouth without ulcers or lesions  NECK: no adenopathy, no asymmetry, masses, or scars and thyroid normal to palpation  RESP: lungs clear to auscultation - no rales, rhonchi or wheezes  CV: regular rate and rhythm, normal S1 S2, no S3 or S4, no murmur, click or rub, no peripheral edema and peripheral pulses strong  ABDOMEN: soft, nontender, no hepatosplenomegaly, no masses and bowel sounds normal  MS: no gross musculoskeletal defects noted, no edema  SKIN: no suspicious lesions or rashes, erythematous scaly rash on bilateral heels  NEURO: Normal strength and tone, mentation intact and speech normal  PSYCH: mentation appears normal, affect normal/bright    Diagnostic Test Results:  Labs reviewed in Epic    ASSESSMENT / PLAN:   (Z00.00) Encounter for Medicare annual wellness exam  (primary encounter diagnosis)  Comment:   Plan:     (R23.8) Vesicular rash  Comment: use sparingly as needed  Plan: triamcinolone (ARISTOCORT HP) 0.5 % external         cream            (D12.6) Adenomatous polyp of colon, unspecified part of colon  Comment: overdue for colonoscopy. Pt declines for now  Plan:     (Z12.31) Encounter for screening mammogram for breast cancer  Comment: encouraged her to schedule.   Plan: *MA Screening Digital Bilateral            (Z13.220) Screening cholesterol level  Comment:   Plan: Lipid panel reflex to direct LDL Fasting            (Z13.1) Screening for diabetes mellitus  Comment:   Plan: **Basic metabolic panel FUTURE 2mo              Patient has been advised of split " "billing requirements and indicates understanding: Yes    COUNSELING:  Reviewed preventive health counseling, as reflected in patient instructions       Regular exercise       Healthy diet/nutrition       Vision screening       Dental care       Colon cancer screening    Estimated body mass index is 21.46 kg/m  as calculated from the following:    Height as of this encounter: 1.638 m (5' 4.5\").    Weight as of this encounter: 57.6 kg (127 lb).    Weight management plan noted, stable and monitoring    She reports that she has been smoking cigarettes. She has a 20.00 pack-year smoking history. She has never used smokeless tobacco.  Tobacco Cessation Action Plan:   Information offered: Patient not interested at this time      Appropriate preventive services were discussed with this patient, including applicable screening as appropriate for cardiovascular disease, diabetes, osteopenia/osteoporosis, and glaucoma.  As appropriate for age/gender, discussed screening for colorectal cancer, prostate cancer, breast cancer, and cervical cancer. Checklist reviewing preventive services available has been given to the patient.    Reviewed patients plan of care and provided an AVS. The Intermediate Care Plan ( asthma action plan, low back pain action plan, and migraine action plan) for Marga meets the Care Plan requirement. This Care Plan has been established and reviewed with the Patient.    Counseling Resources:  ATP IV Guidelines  Pooled Cohorts Equation Calculator  Breast Cancer Risk Calculator  Breast Cancer: Medication to Reduce Risk  FRAX Risk Assessment  ICSI Preventive Guidelines  Dietary Guidelines for Americans, 2010  USDA's MyPlate  ASA Prophylaxis  Lung CA Screening    Tiarra Alegria MD  [facility]    Identified Health Risks:  "

## 2022-02-10 NOTE — TELEPHONE ENCOUNTER
Provider: Are you willing to send the requested medication? Rx prompted.  Please review carefully before sending if appropriate.  Thank you. Janice Mcfarland R.N.    Patient reports that triamcinolone (ARISTOCORT HP) 0.5 % external cream was sent today. She is actually looking for Calcipotriene betamethasone Dipropionate ointment for her feet to be sent.

## 2022-02-11 NOTE — TELEPHONE ENCOUNTER
Patient notified of provider's message as written below. Patient verbalized good understanding, had no further questions and needed no further support.Janice Mcfarland R.N.

## 2022-03-09 NOTE — NURSING NOTE
Blood pressure rechecked after visit    132/80  Naomie Singh CMA Rheumatology  3/9/2022

## 2022-03-09 NOTE — PATIENT INSTRUCTIONS
RHEUMATOLOGY    Dr. Kody Urias    [address] [place] [address]  Phone number: [phone]  Fax number: [phone]      Thank you for choosing [facility]!    Naomie Singh CMA Rheumatology

## 2022-03-09 NOTE — PROGRESS NOTES
Rheumatology Clinic  Visit      Marga Clark MRN# [id number]   YOB: 1945 Age: 76 year old      Date of visit: 3/09/22   PCP: Dr. Tiarra Mak    Chief Complaint   Patient presents with:  Rheumatoid Arthritis    Assessment and Plan     1. Rheumatoid arthritis (RF negative [[facility]], CCP low positive): Dx'd 2011.  Has been followed by Prince Ritter ([facility]) and [facility] ([facility]).  Established with me on 10/30/2017. Previously on MTX (effective), prednisone (effective).  MTX and prednisone were stopped when she was doing well; inflammatory markers elevated so HCQ was started (per record review and patient interview).  Previously when having inflammatory joint symptoms HCQ was increased with resolution of symptoms. Doing well today.  Chronic illness, stable.     - Continue hydroxychloroquine 200mg daily with an additional 200mg every other day (last eye exam was on 10/15/2021 with Dr. Oviedo, advised yearly exam).    - Labs in 6 months: CBC, Creatinine, Hepatic Panel, ESR, CRP    2. R>L trochanteric bursitis, history: improved in the past with regular stretching exercises. Not an issue today.    3.  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  She does not recall if it improved when she was on methotrexate in the past, and has not worsened since being on hydroxychloroquine.  Advised seeing dermatology.  Reportedly this has been an issue for decades.  - Dermatology referral    4. Elevated blood pressure:  Marga to follow up with Primary Care provider regarding elevated blood pressure.     - COVID-19: has received the Pfizer COVID-19 vaccine on 3/17/2021 and 4/7/2021 and 11/1/2021    Total minutes spent in evaluation with patient, documentation, , and review of pertinent studies and chart notes: 20    Ms. Clark verbalized agreement with and understanding of the rational for the diagnosis and treatment plan.  All questions were answered to best of  my ability and the patient's satisfaction. Ms. Clark was advised to contact the clinic with any questions that may arise after the clinic visit.     Thank you for involving me in the care of the patient    Return to clinic: 6 months      HPI   Marga Clark is a 76 year old female with a past medical history significant for inflammatory arthritis who presents for follow-up of inflammatory arthritis.    Today, 3/9/2022: Joints are doing great.  No joint pain or swelling.  Morning stiffness for less than 5 minutes.  No gelling phenomenon.  Arthritis is not preventing her from doing any of her daily activities.  Tolerating hydroxychloroquine well.  She has a rash on the plantar aspect of both feet that she says has been there for decades; gets pustules that eventually heal; has used topical steroids in the past that have been helpful.  Has not seen dermatology for this.  Does not recall if methotrexate helped for this issue; methotrexate was last taken around [id number].  Did not worsen with starting hydroxychloroquine per patient.    Denies fevers, chills, nausea, vomiting, constipation, diarrhea. No abdominal pain. No chest pain/pressure, palpitations, or shortness of breath. No LE swelling. No neck pain. No oral or nasal sores.  No sicca symptoms.     Tobacco: 0.5 ppd  EtOH: Occasional  Drugs: None    ROS   12 point review of system was completed and negative except as noted in the HPI     Active Problem List     Patient Active Problem List   Diagnosis     CARDIOVASCULAR SCREENING; LDL GOAL LESS THAN 130     Advanced directives, counseling/discussion     High risk medications (not anticoagulants) long-term use     Inflammatory arthritis     Steroid long-term use     Rheumatoid arthritis of multiple sites with negative rheumatoid factor (H)     Adenomatous polyp of colon, unspecified part of colon     Tobacco abuse     Past Medical History     Past Medical History:   Diagnosis Date     Glaucoma (increased eye  "pressure)      Inflammatory arthritis 10/31/2011     Past Surgical History   History reviewed. No pertinent surgical history.  Allergy     Allergies   Allergen Reactions     Bees      Current Medication List     Current Outpatient Medications   Medication Sig     dorzolamide-timolol (COSOPT) 2-0.5 % ophthalmic solution Place 1 drop into the right eye 2 times daily     hydroxychloroquine (PLAQUENIL) 200 MG tablet Hydroxychloroquine 200 mg daily, and an additional 200 mg every other day     latanoprost (XALATAN) 0.005 % ophthalmic solution Place 1 drop into both eyes At Bedtime     triamcinolone (ARISTOCORT HP) 0.5 % external cream Apply topically 2 times daily     EPINEPHrine (ANY BX GENERIC EQUIV) 0.3 MG/0.3ML injection 2-pack Inject 0.3 mLs (0.3 mg) into the muscle as needed for anaphylaxis (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         Social History   See HPI    Family History     Family History   Problem Relation Age of Onset     Cerebrovascular Disease Mother      Glaucoma Mother      Heart Disease Father      Diabetes Maternal Grandfather      Diabetes Paternal Grandfather      Macular Degeneration No family hx of      Physical Exam     Temp Readings from Last 3 Encounters:   02/10/22 97.8  F (36.6  C) (Oral)   10/14/19 98.6  F (37  C) (Oral)   05/15/19 98.8  F (37.1  C) (Oral)     BP Readings from Last 5 Encounters:   03/09/22 (!) 152/86   02/10/22 (!) 146/72   09/15/21 (!) 160/84   12/02/19 134/64   10/14/19 132/64     Pulse Readings from Last 1 Encounters:   03/09/22 86     Resp Readings from Last 1 Encounters:   02/10/22 16     Estimated body mass index is 21.4 kg/m  as calculated from the following:    Height as of this encounter: 1.638 m (5' 4.5\").    Weight as of this encounter: 57.4 kg (126 lb 9.6 oz).    GEN: " NAD. Healthy appearing adult.   HEENT:  Anicteric, noninjected sclera. No obvious external lesions of the ear and nose. Hearing intact.  PULM: No increased work of breathing.   MSK: MCPs, PIPs, DIPs without swelling or tenderness to palpation.  Wrists without swelling or tenderness to palpation.  Elbows and shoulders without swelling or tenderness to palpation.    Knees, ankles, and MTPs without swelling or tenderness to palpation.    SKIN:  rash on the plantar aspect of both feet, with several pustules on each foot  PSYCH: Alert. Appropriate.     Labs / Imaging (select studies)     CBC  Recent Labs   Lab Test 09/15/21  0836 02/10/21  0907 11/02/20  0927 05/04/20  0906   WBC 8.0 7.2 7.3 7.2   RBC 4.71 4.65 4.62 4.88   HGB 14.1 13.7 13.7 14.7   HCT 43.2 43.1 42.4 45.0   MCV 92 93 92 92   RDW 14.2 13.9 13.3 13.5    222 234 212   MCH 29.9 29.5 29.7 30.1   MCHC 32.6 31.8 32.3 32.7   NEUTROPHIL 64 59.5 59.1 63.7   LYMPH 25 27.3 28.6 25.0   MONOCYTE 9 9.6 8.9 8.5   EOSINOPHIL 2 3.2 2.9 2.2   BASOPHIL 1 0.4 0.5 0.6   ANEU  --  4.3 4.3 4.6   ALYM  --  2.0 2.1 1.8   MORENITA  --  0.7 0.7 0.6   AEOS  --  0.2 0.2 0.2   ABAS  --  0.0 0.0 0.0   ANEUTAUTO 5.1  --   --   --    ALYMPAUTO 2.0  --   --   --    AMONOAUTO 0.7  --   --   --    AEOSAUTO 0.2  --   --   --    ABSBASO 0.0  --   --   --      CMP  Recent Labs   Lab Test 02/10/22  0954 09/15/21  0836 02/10/21  0907 11/02/20  0927 05/04/20  0906 01/22/18  0827 07/14/17  0806     --   --   --   --   --   --    POTASSIUM 4.1  --   --   --   --   --   --    CHLORIDE 106  --   --   --   --   --   --    CO2 25  --   --   --   --   --   --    ANIONGAP 7  --   --   --   --   --   --    GLC 92  --   --   --   --   --  80   BUN 8  --   --   --   --   --   --    CR 0.62 0.67 0.67 0.60 0.73   < >  --    GFRESTIMATED >90 87 86 90 82   < >  --    GFRESTBLACK  --   --  >90 >90 >90   < >  --    JOANNE 9.6  --   --   --   --   --   --    BILITOTAL  --  0.6 0.9 0.9 0.8   < >  --     ALBUMIN  --  3.9 3.8 3.5 4.0   < >  --    PROTTOTAL  --  7.9 7.7 7.4 8.0   < >  --    ALKPHOS  --  105 101 105 99   < >  --    AST  --  33 33 38 44   < >  --    ALT  --  22 19 23 19   < >  --     < > = values in this interval not displayed.     Calcium/VitaminD  Recent Labs   Lab Test 02/10/22  0954   JOANNE 9.6     ESR/CRP  Recent Labs   Lab Test 09/15/21  0836 02/10/21  0907 11/02/20  0927   SED 5 34* 37*   CRP 7.0 11.8* 12.7*     Lipid Panel  Recent Labs   Lab Test 02/10/22  0954 07/14/17  0806   CHOL 223* 230*   TRIG 101 72   HDL 67 73   * 143*   NHDL 156* 157*     Hepatitis B  Recent Labs   Lab Test 01/22/18  0827   HBCAB Nonreactive   HEPBANG Nonreactive     Immunization History     Immunization History   Administered Date(s) Administered     COVID-19,PF,Pfizer (12+ Yrs) 03/17/2021, 04/07/2021, 11/01/2021     Pneumo Conj 13-V (2010&after) 10/30/2017     Pneumococcal 23 valent 10/11/2011     TDAP Vaccine (Adacel) 01/10/2007, 07/05/2017     Td (Adult), Adsorbed 07/26/2004     Zoster vaccine, live 07/28/2009          Chart documentation done in part with Dragon Voice recognition Software. Although reviewed after completion, some word and grammatical error may remain.    Kody Urias MD

## 2025-04-06 NOTE — LETTER
February 11, 2022      Marga Clark  [address] [facility] [facility] [place] [address]        [place]     Your cholesterol has improved a bit. Nice job   Your electrolytes, blood sugar and electrolytes are normal.     Tiarra Alegria MD       Resulted Orders   Lipid panel reflex to direct LDL Fasting   Result Value Ref Range    Cholesterol 223 (H) <200 mg/dL    Triglycerides 101 <150 mg/dL    Direct Measure HDL 67 >=50 mg/dL    LDL Cholesterol Calculated 136 (H) <=100 mg/dL    Non HDL Cholesterol 156 (H) <130 mg/dL    Patient Fasting > 8hrs? Yes     Narrative    Cholesterol  Desirable:  <200 mg/dL    Triglycerides  Normal:  Less than 150 mg/dL  Borderline High:  150-199 mg/dL  High:  200-499 mg/dL  Very High:  Greater than or equal to 500 mg/dL    Direct Measure HDL  Female:  Greater than or equal to 50 mg/dL   Male:  Greater than or equal to 40 mg/dL    LDL Cholesterol  Desirable:  <100mg/dL  Above Desirable:  100-129 mg/dL   Borderline High:  130-159 mg/dL   High:  160-189 mg/dL   Very High:  >= 190 mg/dL    Non HDL Cholesterol  Desirable:  130 mg/dL  Above Desirable:  130-159 mg/dL  Borderline High:  160-189 mg/dL  High:  190-219 mg/dL  Very High:  Greater than or equal to 220 mg/dL   **Basic metabolic panel FUTURE 2mo   Result Value Ref Range    Sodium 138 133 - 144 mmol/L    Potassium 4.1 3.4 - 5.3 mmol/L    Chloride 106 94 - 109 mmol/L    Carbon Dioxide (CO2) 25 20 - 32 mmol/L    Anion Gap 7 3 - 14 mmol/L    Urea Nitrogen 8 7 - 30 mg/dL    Creatinine 0.62 0.52 - 1.04 mg/dL    Calcium 9.6 8.5 - 10.1 mg/dL    Glucose 92 70 - 99 mg/dL    GFR Estimate >90 >60 mL/min/1.73m2      Comment:      Effective December 21, 2021 eGFRcr in adults is calculated using the 2021 CKD-EPI creatinine equation which includes age and gender (Johanna lala al., NEJM, DOI: 10.1056/NRRWrr5154098)                Home with home care